# Patient Record
Sex: FEMALE | Race: WHITE | NOT HISPANIC OR LATINO | Employment: OTHER | URBAN - NONMETROPOLITAN AREA
[De-identification: names, ages, dates, MRNs, and addresses within clinical notes are randomized per-mention and may not be internally consistent; named-entity substitution may affect disease eponyms.]

---

## 2017-01-11 RX ORDER — ALPRAZOLAM 0.5 MG/1
TABLET ORAL
Qty: 60 TABLET | Refills: 0 | Status: SHIPPED | OUTPATIENT
Start: 2017-01-11 | End: 2017-03-20 | Stop reason: SDUPTHER

## 2017-01-25 ENCOUNTER — LAB (OUTPATIENT)
Dept: LAB | Facility: OTHER | Age: 82
End: 2017-01-25

## 2017-01-25 DIAGNOSIS — D50.9 IRON DEFICIENCY ANEMIA, UNSPECIFIED IRON DEFICIENCY ANEMIA TYPE: ICD-10-CM

## 2017-01-25 DIAGNOSIS — I10 ESSENTIAL HYPERTENSION: ICD-10-CM

## 2017-01-25 DIAGNOSIS — E78.5 HYPERLIPIDEMIA, UNSPECIFIED HYPERLIPIDEMIA TYPE: ICD-10-CM

## 2017-01-25 DIAGNOSIS — D64.9 ANEMIA, UNSPECIFIED TYPE: ICD-10-CM

## 2017-01-25 DIAGNOSIS — E78.5 HYPERLIPIDEMIA, UNSPECIFIED HYPERLIPIDEMIA TYPE: Primary | ICD-10-CM

## 2017-01-25 LAB
ALBUMIN SERPL-MCNC: 4.6 G/DL (ref 3.2–5.5)
ALBUMIN/GLOB SERPL: 1.5 G/DL (ref 1–3)
ALP SERPL-CCNC: 71 U/L (ref 15–121)
ALT SERPL W P-5'-P-CCNC: 25 U/L (ref 10–60)
ANION GAP SERPL CALCULATED.3IONS-SCNC: 8 MMOL/L (ref 5–15)
ARTICHOKE IGE QN: 79 MG/DL (ref 0–129)
AST SERPL-CCNC: 35 U/L (ref 10–60)
BASOPHILS # BLD AUTO: 0.02 10*3/MM3 (ref 0–0.2)
BASOPHILS NFR BLD AUTO: 0.4 % (ref 0–2)
BILIRUB SERPL-MCNC: 1.2 MG/DL (ref 0.2–1)
BUN BLD-MCNC: 9 MG/DL (ref 8–25)
BUN/CREAT SERPL: 10 (ref 7–25)
CALCIUM SPEC-SCNC: 10 MG/DL (ref 8.4–10.8)
CHLORIDE SERPL-SCNC: 104 MMOL/L (ref 100–112)
CO2 SERPL-SCNC: 26 MMOL/L (ref 20–32)
CREAT BLD-MCNC: 0.9 MG/DL (ref 0.4–1.3)
DEPRECATED RDW RBC AUTO: 40.9 FL (ref 36.4–46.3)
EOSINOPHIL # BLD AUTO: 0.15 10*3/MM3 (ref 0–0.7)
EOSINOPHIL NFR BLD AUTO: 3.2 % (ref 0–7)
ERYTHROCYTE [DISTWIDTH] IN BLOOD BY AUTOMATED COUNT: 13 % (ref 11.5–14.5)
GFR SERPL CREATININE-BSD FRML MDRD: 60 ML/MIN/1.73 (ref 39–90)
GLOBULIN UR ELPH-MCNC: 3.1 GM/DL (ref 2.5–4.6)
GLUCOSE BLD-MCNC: 109 MG/DL (ref 70–100)
HCT VFR BLD AUTO: 35.8 % (ref 35–45)
HGB BLD-MCNC: 12.3 G/DL (ref 12–15.5)
LYMPHOCYTES # BLD AUTO: 1.56 10*3/MM3 (ref 0.6–4.2)
LYMPHOCYTES NFR BLD AUTO: 32.9 % (ref 10–50)
MCH RBC QN AUTO: 30.3 PG (ref 26.5–34)
MCHC RBC AUTO-ENTMCNC: 34.4 G/DL (ref 31.4–36)
MCV RBC AUTO: 88.2 FL (ref 80–98)
MONOCYTES # BLD AUTO: 0.51 10*3/MM3 (ref 0–0.9)
MONOCYTES NFR BLD AUTO: 10.8 % (ref 0–12)
NEUTROPHILS # BLD AUTO: 2.5 10*3/MM3 (ref 2–8.6)
NEUTROPHILS NFR BLD AUTO: 52.7 % (ref 37–80)
PLATELET # BLD AUTO: 180 10*3/MM3 (ref 150–450)
PMV BLD AUTO: 10 FL (ref 8–12)
POTASSIUM BLD-SCNC: 4.8 MMOL/L (ref 3.4–5.4)
PROT SERPL-MCNC: 7.7 G/DL (ref 6.7–8.2)
RBC # BLD AUTO: 4.06 10*6/MM3 (ref 3.77–5.16)
SODIUM BLD-SCNC: 138 MMOL/L (ref 134–146)
WBC NRBC COR # BLD: 4.74 10*3/MM3 (ref 3.2–9.8)

## 2017-01-25 PROCEDURE — 83721 ASSAY OF BLOOD LIPOPROTEIN: CPT | Performed by: INTERNAL MEDICINE

## 2017-01-25 PROCEDURE — 82728 ASSAY OF FERRITIN: CPT | Performed by: INTERNAL MEDICINE

## 2017-01-25 PROCEDURE — 36415 COLL VENOUS BLD VENIPUNCTURE: CPT | Performed by: INTERNAL MEDICINE

## 2017-01-25 PROCEDURE — 80053 COMPREHEN METABOLIC PANEL: CPT | Performed by: INTERNAL MEDICINE

## 2017-01-25 PROCEDURE — 85025 COMPLETE CBC W/AUTO DIFF WBC: CPT | Performed by: INTERNAL MEDICINE

## 2017-01-25 PROCEDURE — 82607 VITAMIN B-12: CPT | Performed by: INTERNAL MEDICINE

## 2017-01-26 LAB
FERRITIN SERPL-MCNC: 28.8 NG/ML (ref 11.1–264)
VIT B12 BLD-MCNC: 495 PG/ML (ref 239–931)

## 2017-01-30 RX ORDER — POTASSIUM CHLORIDE 750 MG/1
TABLET, FILM COATED, EXTENDED RELEASE ORAL
Qty: 30 TABLET | Refills: 11 | Status: SHIPPED | OUTPATIENT
Start: 2017-01-30 | End: 2017-02-01

## 2017-02-01 ENCOUNTER — OFFICE VISIT (OUTPATIENT)
Dept: FAMILY MEDICINE CLINIC | Facility: CLINIC | Age: 82
End: 2017-02-01

## 2017-02-01 VITALS
WEIGHT: 128 LBS | SYSTOLIC BLOOD PRESSURE: 118 MMHG | BODY MASS INDEX: 25.8 KG/M2 | DIASTOLIC BLOOD PRESSURE: 72 MMHG | HEIGHT: 59 IN | HEART RATE: 76 BPM

## 2017-02-01 DIAGNOSIS — E78.5 HYPERLIPIDEMIA, UNSPECIFIED HYPERLIPIDEMIA TYPE: ICD-10-CM

## 2017-02-01 DIAGNOSIS — D50.9 IRON DEFICIENCY ANEMIA, UNSPECIFIED IRON DEFICIENCY ANEMIA TYPE: ICD-10-CM

## 2017-02-01 DIAGNOSIS — R53.83 FATIGUE, UNSPECIFIED TYPE: ICD-10-CM

## 2017-02-01 DIAGNOSIS — R73.01 IMPAIRED FASTING GLUCOSE: ICD-10-CM

## 2017-02-01 DIAGNOSIS — M81.0 OSTEOPOROSIS: ICD-10-CM

## 2017-02-01 DIAGNOSIS — I10 ESSENTIAL HYPERTENSION: Primary | ICD-10-CM

## 2017-02-01 PROCEDURE — 99214 OFFICE O/P EST MOD 30 MIN: CPT | Performed by: INTERNAL MEDICINE

## 2017-02-01 RX ORDER — FERROUS SULFATE 325(65) MG
325 TABLET ORAL EVERY OTHER DAY
Qty: 15 TABLET | Refills: 11 | Status: SHIPPED | OUTPATIENT
Start: 2017-02-01 | End: 2018-02-19 | Stop reason: SDUPTHER

## 2017-02-01 NOTE — PROGRESS NOTES
Subjective     Brittney Rollins is a 82 y.o. female.     History of Present Illness   Brittney is here for six-month followup of high cholesterol, hypertension, GERD, osteoporosis, REY, and hypokalemia, among other issues.      DEXA dated 06/2015 revealed osteoporosis.  She experienced fatigue and musculoskeletal pain with her first Prolia injection.  She also reportd a pruritic rash on her left thigh following the injection.  She did not have any additional Prolia injections.  We discussed treatment options.  From a GI standpoint, I don't think she could tolerate an oral bisphosphonates due to GERD/gastritis.  We will re-evaluate DEXA 06/2017 and discuss options at that time pending results of the DEXA.  She denies any recent falls.     She continues to take Xanax chronically for anxiety.  I have tried many nonaddictive medications for anxiety through the years.  She never tries them for more than a few days if at all.    Her weight is up 2 pounds in the past 6 months.  Her blood pressure is well controlled.    She stopped taking her potassium supplement approximately 2 months ago.  Potassium level is normal.  I told her to stop the K-Dur.    With her visit in summer 2016, she was complaining of some constipation issues with the oral iron tablet twice weekly.  Her ferritin level was adequate, so I told her to reduce the ferrous sulfate to 1 time weekly.  She has not been taking the ferritin at all for at least a few months.  Her ferritin level has declined from 44-28.  I have asked her to resume ferrous sulfate once weekly.    Her labs are all reviewed with results listed below.    Review of Systems   Constitutional: Positive for fatigue. Negative for chills and fever.   HENT: Negative for congestion, ear pain, postnasal drip, sinus pressure and sore throat.    Respiratory: Negative for cough, shortness of breath and wheezing.    Cardiovascular: Negative for chest pain, palpitations and leg swelling.  "  Gastrointestinal: Negative for abdominal pain, blood in stool, constipation, diarrhea, nausea and vomiting.   Endocrine: Negative for cold intolerance, heat intolerance, polydipsia and polyuria.   Genitourinary: Negative for dysuria, frequency, hematuria and urgency.   Musculoskeletal: Positive for arthralgias and back pain.   Skin: Negative for rash.   Neurological: Negative for syncope and weakness.       Objective     Visit Vitals   • /72   • Pulse 76   • Ht 59\" (149.9 cm)   • Wt 128 lb (58.1 kg)   • BMI 25.85 kg/m2       Physical Exam   Constitutional: She is oriented to person, place, and time. She appears well-developed and well-nourished. No distress.   Somewhat frail   HENT:   Head: Normocephalic and atraumatic.   Nose: Right sinus exhibits no maxillary sinus tenderness and no frontal sinus tenderness. Left sinus exhibits no maxillary sinus tenderness and no frontal sinus tenderness.   Mouth/Throat: Uvula is midline, oropharynx is clear and moist and mucous membranes are normal. No oral lesions. No tonsillar exudate.   Eyes: Conjunctivae and EOM are normal. Pupils are equal, round, and reactive to light.   Neck: Trachea normal. Neck supple. No JVD present. Carotid bruit is not present. No tracheal deviation present. No thyroid mass and no thyromegaly present.   Cardiovascular: Normal rate, regular rhythm and normal heart sounds.   No extrasystoles are present. PMI is not displaced.    No murmur heard.  Pulmonary/Chest: Effort normal and breath sounds normal. No accessory muscle usage. No respiratory distress. She has no decreased breath sounds. She has no wheezes. She has no rhonchi. She has no rales.   Abdominal: Soft. Bowel sounds are normal. She exhibits no distension. There is no hepatosplenomegaly. There is no tenderness.   Musculoskeletal:   Normal range of motion of the left shoulder, but a little tender with extreme of external rotation.       Vascular Status -  Her exam exhibits right foot " vasculature normal. Her exam exhibits no right foot edema. Her exam exhibits left foot vasculature normal. Her exam exhibits no left foot edema.  Lymphadenopathy:     She has no cervical adenopathy.   Neurological: She is alert and oriented to person, place, and time. No cranial nerve deficit. Coordination normal.   Skin: Skin is warm, dry and intact. No rash noted. No cyanosis. Nails show no clubbing.   Psychiatric: She has a normal mood and affect. Her speech is normal and behavior is normal. Thought content normal.   Vitals reviewed.      Assessment/Plan   Restart ferrous sulfate, but only once weekly initially.  We will continue to follow.    Stop the K-Dur.    Continue her other current medications.  Continue to be cautious regarding NSAID use due to her history of gastritis and GERD.    Repeat DEXA in the summer of 2017.  See above.      Diagnoses and all orders for this visit:    Essential hypertension  -     CBC Auto Differential; Future  -     Comprehensive Metabolic Panel; Future    Hyperlipidemia, unspecified hyperlipidemia type  -     Lipid Panel; Future    Impaired fasting glucose    Osteoporosis    Fatigue, unspecified type  -     TSH; Future    Iron deficiency anemia, unspecified iron deficiency anemia type  -     Ferritin; Future    Other orders  -     ferrous sulfate 325 (65 FE) MG tablet; Take 1 tablet by mouth Every Other Day.      Lab on 01/25/2017   Component Date Value Ref Range Status   • Glucose 01/25/2017 109* 70 - 100 mg/dL Final   • BUN 01/25/2017 9  8 - 25 mg/dL Final   • Creatinine 01/25/2017 0.90  0.40 - 1.30 mg/dL Final   • Sodium 01/25/2017 138  134 - 146 mmol/L Final   • Potassium 01/25/2017 4.8  3.4 - 5.4 mmol/L Final   • Chloride 01/25/2017 104  100 - 112 mmol/L Final   • CO2 01/25/2017 26.0  20.0 - 32.0 mmol/L Final   • Calcium 01/25/2017 10.0  8.4 - 10.8 mg/dL Final   • Total Protein 01/25/2017 7.7  6.7 - 8.2 g/dL Final   • Albumin 01/25/2017 4.60  3.20 - 5.50 g/dL Final   • ALT  (SGPT) 01/25/2017 25  10 - 60 U/L Final   • AST (SGOT) 01/25/2017 35  10 - 60 U/L Final   • Alkaline Phosphatase 01/25/2017 71  15 - 121 U/L Final   • Total Bilirubin 01/25/2017 1.2* 0.2 - 1.0 mg/dL Final   • eGFR Non African Amer 01/25/2017 60  39 - 90 mL/min/1.73 Final   • Globulin 01/25/2017 3.1  2.5 - 4.6 gm/dL Final   • A/G Ratio 01/25/2017 1.5  1.0 - 3.0 g/dL Final   • BUN/Creatinine Ratio 01/25/2017 10.0  7.0 - 25.0 Final   • Anion Gap 01/25/2017 8.0  5.0 - 15.0 mmol/L Final   • LDL Cholesterol  01/25/2017 79  0 - 129 mg/dL Final   • WBC 01/25/2017 4.74  3.20 - 9.80 10*3/mm3 Final   • RBC 01/25/2017 4.06  3.77 - 5.16 10*6/mm3 Final   • Hemoglobin 01/25/2017 12.3  12.0 - 15.5 g/dL Final   • Hematocrit 01/25/2017 35.8  35.0 - 45.0 % Final   • MCV 01/25/2017 88.2  80.0 - 98.0 fL Final   • MCH 01/25/2017 30.3  26.5 - 34.0 pg Final   • MCHC 01/25/2017 34.4  31.4 - 36.0 g/dL Final   • RDW 01/25/2017 13.0  11.5 - 14.5 % Final   • RDW-SD 01/25/2017 40.9  36.4 - 46.3 fl Final   • MPV 01/25/2017 10.0  8.0 - 12.0 fL Final   • Platelets 01/25/2017 180  150 - 450 10*3/mm3 Final   • Neutrophil % 01/25/2017 52.7  37.0 - 80.0 % Final   • Lymphocyte % 01/25/2017 32.9  10.0 - 50.0 % Final   • Monocyte % 01/25/2017 10.8  0.0 - 12.0 % Final   • Eosinophil % 01/25/2017 3.2  0.0 - 7.0 % Final   • Basophil % 01/25/2017 0.4  0.0 - 2.0 % Final   • Neutrophils, Absolute 01/25/2017 2.50  2.00 - 8.60 10*3/mm3 Final   • Lymphocytes, Absolute 01/25/2017 1.56  0.60 - 4.20 10*3/mm3 Final   • Monocytes, Absolute 01/25/2017 0.51  0.00 - 0.90 10*3/mm3 Final   • Eosinophils, Absolute 01/25/2017 0.15  0.00 - 0.70 10*3/mm3 Final   • Basophils, Absolute 01/25/2017 0.02  0.00 - 0.20 10*3/mm3 Final   • Vitamin B-12 01/25/2017 495  239 - 931 pg/mL Final   • Ferritin 01/25/2017 28.80  11.10 - 264.00 ng/mL Final   ]

## 2017-03-20 RX ORDER — ALPRAZOLAM 0.5 MG/1
TABLET ORAL
Qty: 60 TABLET | Refills: 0 | Status: SHIPPED | OUTPATIENT
Start: 2017-03-20 | End: 2017-05-31 | Stop reason: SDUPTHER

## 2017-03-20 RX ORDER — ATORVASTATIN CALCIUM 10 MG/1
TABLET, FILM COATED ORAL
Qty: 90 TABLET | Refills: 3 | Status: SHIPPED | OUTPATIENT
Start: 2017-03-20 | End: 2017-12-12 | Stop reason: SDUPTHER

## 2017-03-20 RX ORDER — ATENOLOL 50 MG/1
TABLET ORAL
Qty: 90 TABLET | Refills: 3 | Status: SHIPPED | OUTPATIENT
Start: 2017-03-20 | End: 2017-12-12 | Stop reason: SDUPTHER

## 2017-04-07 RX ORDER — LISINOPRIL 10 MG/1
10 TABLET ORAL DAILY
Qty: 90 TABLET | Refills: 3 | Status: SHIPPED | OUTPATIENT
Start: 2017-04-07 | End: 2018-01-17 | Stop reason: SDUPTHER

## 2017-05-31 RX ORDER — ALPRAZOLAM 0.5 MG/1
TABLET ORAL
Qty: 60 TABLET | Refills: 0 | Status: SHIPPED | OUTPATIENT
Start: 2017-05-31 | End: 2017-08-01 | Stop reason: SDUPTHER

## 2017-05-31 RX ORDER — NAPROXEN 375 MG/1
375 TABLET ORAL DAILY PRN
Qty: 30 TABLET | Refills: 2 | Status: SHIPPED | OUTPATIENT
Start: 2017-05-31 | End: 2017-12-12 | Stop reason: SDUPTHER

## 2017-07-13 RX ORDER — ALPRAZOLAM 0.5 MG/1
TABLET ORAL
Qty: 60 TABLET | Refills: 0 | OUTPATIENT
Start: 2017-07-13

## 2017-07-26 ENCOUNTER — LAB (OUTPATIENT)
Dept: LAB | Facility: OTHER | Age: 82
End: 2017-07-26

## 2017-07-26 DIAGNOSIS — E78.5 HYPERLIPIDEMIA, UNSPECIFIED HYPERLIPIDEMIA TYPE: ICD-10-CM

## 2017-07-26 DIAGNOSIS — R53.83 FATIGUE, UNSPECIFIED TYPE: ICD-10-CM

## 2017-07-26 DIAGNOSIS — D50.9 IRON DEFICIENCY ANEMIA, UNSPECIFIED IRON DEFICIENCY ANEMIA TYPE: ICD-10-CM

## 2017-07-26 DIAGNOSIS — I10 ESSENTIAL HYPERTENSION: ICD-10-CM

## 2017-07-26 LAB
ALBUMIN SERPL-MCNC: 4.6 G/DL (ref 3.2–5.5)
ALBUMIN/GLOB SERPL: 1.4 G/DL (ref 1–3)
ALP SERPL-CCNC: 68 U/L (ref 15–121)
ALT SERPL W P-5'-P-CCNC: 22 U/L (ref 10–60)
ANION GAP SERPL CALCULATED.3IONS-SCNC: 11 MMOL/L (ref 5–15)
AST SERPL-CCNC: 33 U/L (ref 10–60)
BASOPHILS # BLD AUTO: 0.01 10*3/MM3 (ref 0–0.2)
BASOPHILS NFR BLD AUTO: 0.2 % (ref 0–2)
BILIRUB SERPL-MCNC: 1.2 MG/DL (ref 0.2–1)
BUN BLD-MCNC: 13 MG/DL (ref 8–25)
BUN/CREAT SERPL: 16.3 (ref 7–25)
CALCIUM SPEC-SCNC: 9.4 MG/DL (ref 8.4–10.8)
CHLORIDE SERPL-SCNC: 97 MMOL/L (ref 100–112)
CHOLEST SERPL-MCNC: 139 MG/DL (ref 150–200)
CO2 SERPL-SCNC: 22 MMOL/L (ref 20–32)
CREAT BLD-MCNC: 0.8 MG/DL (ref 0.4–1.3)
DEPRECATED RDW RBC AUTO: 40.9 FL (ref 36.4–46.3)
EOSINOPHIL # BLD AUTO: 0.1 10*3/MM3 (ref 0–0.7)
EOSINOPHIL NFR BLD AUTO: 2.1 % (ref 0–7)
ERYTHROCYTE [DISTWIDTH] IN BLOOD BY AUTOMATED COUNT: 12.8 % (ref 11.5–14.5)
GFR SERPL CREATININE-BSD FRML MDRD: 69 ML/MIN/1.73 (ref 39–90)
GLOBULIN UR ELPH-MCNC: 3.4 GM/DL (ref 2.5–4.6)
GLUCOSE BLD-MCNC: 93 MG/DL (ref 70–100)
HCT VFR BLD AUTO: 36 % (ref 35–45)
HDLC SERPL-MCNC: 52 MG/DL (ref 35–100)
HGB BLD-MCNC: 12.7 G/DL (ref 12–15.5)
LDLC SERPL CALC-MCNC: 63 MG/DL
LDLC/HDLC SERPL: 1.21 {RATIO}
LYMPHOCYTES # BLD AUTO: 1.52 10*3/MM3 (ref 0.6–4.2)
LYMPHOCYTES NFR BLD AUTO: 31.5 % (ref 10–50)
MCH RBC QN AUTO: 31.6 PG (ref 26.5–34)
MCHC RBC AUTO-ENTMCNC: 35.3 G/DL (ref 31.4–36)
MCV RBC AUTO: 89.6 FL (ref 80–98)
MONOCYTES # BLD AUTO: 0.71 10*3/MM3 (ref 0–0.9)
MONOCYTES NFR BLD AUTO: 14.7 % (ref 0–12)
NEUTROPHILS # BLD AUTO: 2.49 10*3/MM3 (ref 2–8.6)
NEUTROPHILS NFR BLD AUTO: 51.5 % (ref 37–80)
PLATELET # BLD AUTO: 197 10*3/MM3 (ref 150–450)
PMV BLD AUTO: 9.4 FL (ref 8–12)
POTASSIUM BLD-SCNC: 3.9 MMOL/L (ref 3.4–5.4)
PROT SERPL-MCNC: 8 G/DL (ref 6.7–8.2)
RBC # BLD AUTO: 4.02 10*6/MM3 (ref 3.77–5.16)
SODIUM BLD-SCNC: 130 MMOL/L (ref 134–146)
TRIGL SERPL-MCNC: 120 MG/DL (ref 35–160)
VLDLC SERPL-MCNC: 24 MG/DL
WBC NRBC COR # BLD: 4.83 10*3/MM3 (ref 3.2–9.8)

## 2017-07-26 PROCEDURE — 82728 ASSAY OF FERRITIN: CPT | Performed by: INTERNAL MEDICINE

## 2017-07-26 PROCEDURE — 85025 COMPLETE CBC W/AUTO DIFF WBC: CPT | Performed by: INTERNAL MEDICINE

## 2017-07-26 PROCEDURE — 80061 LIPID PANEL: CPT | Performed by: INTERNAL MEDICINE

## 2017-07-26 PROCEDURE — 84443 ASSAY THYROID STIM HORMONE: CPT | Performed by: INTERNAL MEDICINE

## 2017-07-26 PROCEDURE — 36415 COLL VENOUS BLD VENIPUNCTURE: CPT | Performed by: INTERNAL MEDICINE

## 2017-07-26 PROCEDURE — 80053 COMPREHEN METABOLIC PANEL: CPT | Performed by: INTERNAL MEDICINE

## 2017-07-27 LAB
FERRITIN SERPL-MCNC: 94 NG/ML (ref 11.1–264)
TSH SERPL DL<=0.05 MIU/L-ACNC: 2.18 MIU/ML (ref 0.46–4.68)

## 2017-08-01 ENCOUNTER — OFFICE VISIT (OUTPATIENT)
Dept: FAMILY MEDICINE CLINIC | Facility: CLINIC | Age: 82
End: 2017-08-01

## 2017-08-01 VITALS
SYSTOLIC BLOOD PRESSURE: 130 MMHG | DIASTOLIC BLOOD PRESSURE: 60 MMHG | HEIGHT: 59 IN | WEIGHT: 121.8 LBS | HEART RATE: 68 BPM | TEMPERATURE: 98.2 F | BODY MASS INDEX: 24.56 KG/M2

## 2017-08-01 DIAGNOSIS — I71.20 THORACIC AORTIC ANEURYSM WITHOUT RUPTURE (HCC): Chronic | ICD-10-CM

## 2017-08-01 DIAGNOSIS — F41.1 GENERALIZED ANXIETY DISORDER: Chronic | ICD-10-CM

## 2017-08-01 DIAGNOSIS — G47.9 SLEEP DISORDER: Chronic | ICD-10-CM

## 2017-08-01 DIAGNOSIS — I10 ESSENTIAL HYPERTENSION: Chronic | ICD-10-CM

## 2017-08-01 DIAGNOSIS — M81.0 OSTEOPOROSIS: Chronic | ICD-10-CM

## 2017-08-01 DIAGNOSIS — F34.1 DYSTHYMIA: Chronic | ICD-10-CM

## 2017-08-01 DIAGNOSIS — D50.9 IRON DEFICIENCY ANEMIA, UNSPECIFIED IRON DEFICIENCY ANEMIA TYPE: Chronic | ICD-10-CM

## 2017-08-01 DIAGNOSIS — R73.01 IMPAIRED FASTING GLUCOSE: Chronic | ICD-10-CM

## 2017-08-01 DIAGNOSIS — E78.2 MIXED HYPERLIPIDEMIA: Primary | Chronic | ICD-10-CM

## 2017-08-01 DIAGNOSIS — K58.1 IRRITABLE BOWEL SYNDROME WITH CONSTIPATION: Chronic | ICD-10-CM

## 2017-08-01 DIAGNOSIS — K29.30 CHRONIC SUPERFICIAL GASTRITIS WITHOUT BLEEDING: Chronic | ICD-10-CM

## 2017-08-01 PROCEDURE — 99214 OFFICE O/P EST MOD 30 MIN: CPT | Performed by: INTERNAL MEDICINE

## 2017-08-01 RX ORDER — POTASSIUM CHLORIDE 750 MG/1
1 TABLET, FILM COATED, EXTENDED RELEASE ORAL DAILY
Refills: 11 | COMMUNITY
Start: 2017-07-07 | End: 2018-03-09 | Stop reason: SDUPTHER

## 2017-08-01 RX ORDER — ALPRAZOLAM 0.5 MG/1
TABLET ORAL
Qty: 60 TABLET | Refills: 0 | Status: SHIPPED | OUTPATIENT
Start: 2017-08-01 | End: 2017-10-02 | Stop reason: SDUPTHER

## 2017-08-01 NOTE — PROGRESS NOTES
Subjective     Brittney Rollins is a 82 y.o. female.     History of Present Illness   Brittney is here for six-month followup of high cholesterol, hypertension, GERD, osteoporosis, REY, and hypokalemia, among other issues.       DEXA dated 06/2015 revealed osteoporosis.  She experienced fatigue and musculoskeletal pain with her first Prolia injection.  She also reportd a pruritic rash on her left thigh following the injection.  She did not have any additional Prolia injections.   From a GI standpoint, I don't think she could tolerate an oral bisphosphonates due to GERD/gastritis.   She is due repeat DEXA this summer.  I recommend obtaining the DEXA and then discuss options at that time pending results of the DEXA.  She denies any recent falls.      She continues to take Xanax chronically for anxiety.  I have tried many nonaddictive medications for anxiety through the years.  She never tries them for more than a few days if at all.    She continues to report chronic but stable back pain due to spinal stenosis and DJD.  Symptoms are aggravated by standing and relieved sitting or the supine position.  She denies any radicular symptoms.    She has a history of thoracic aortic aneurysm.  I recommend repeat imaging to reevaluate this.  She agrees.    She has a chronic iron deficiency anemia.  She takes iron very frequently, it aggravates constipation.  She can usually tolerate supplemental ferrous sulfate once weekly.  We asked her to resume the ferrous sulfate 6 months ago when she had discontinued it, resulting in a gradual decrease in her ferritin.    Her labs are all reviewed with results listed below.  Hemoglobin is normal.  Ferritin has improved.  The profile is at goal.  Liver and renal functions are normal.  Review of Systems   Constitutional: Positive for fatigue. Negative for chills and fever.   HENT: Negative for congestion, ear pain, postnasal drip, sinus pressure and sore throat.    Respiratory: Negative for  "cough, shortness of breath and wheezing.    Cardiovascular: Negative for chest pain, palpitations and leg swelling.   Gastrointestinal: Negative for abdominal pain, blood in stool, constipation, diarrhea, nausea and vomiting.   Endocrine: Negative for cold intolerance, heat intolerance, polydipsia and polyuria.   Genitourinary: Negative for dysuria, frequency, hematuria and urgency.   Musculoskeletal: Positive for arthralgias and back pain.   Skin: Negative for rash.   Neurological: Negative for syncope and weakness.       Objective     /60  Pulse 68  Temp 98.2 °F (36.8 °C) (Oral)   Ht 59\" (149.9 cm)  Wt 121 lb 12.8 oz (55.2 kg)  BMI 24.6 kg/m2    Physical Exam   Constitutional: She is oriented to person, place, and time. She appears well-developed and well-nourished. No distress.   HENT:   Head: Normocephalic and atraumatic.   Nose: Right sinus exhibits no maxillary sinus tenderness and no frontal sinus tenderness. Left sinus exhibits no maxillary sinus tenderness and no frontal sinus tenderness.   Mouth/Throat: Uvula is midline, oropharynx is clear and moist and mucous membranes are normal. No oral lesions. No tonsillar exudate.   Eyes: Conjunctivae and EOM are normal. Pupils are equal, round, and reactive to light.   Neck: Trachea normal. Neck supple. No JVD present. Carotid bruit is not present. No tracheal deviation present. No thyroid mass and no thyromegaly present.   Cardiovascular: Normal rate, regular rhythm and normal heart sounds.   No extrasystoles are present. PMI is not displaced.    No murmur heard.  Pulmonary/Chest: Effort normal and breath sounds normal. No accessory muscle usage. No respiratory distress. She has no decreased breath sounds. She has no wheezes. She has no rhonchi. She has no rales.   Abdominal: Soft. Bowel sounds are normal. She exhibits no distension. There is no hepatosplenomegaly. There is no tenderness.       Vascular Status -  Her exam exhibits right foot vasculature " normal. Her exam exhibits no right foot edema. Her exam exhibits left foot vasculature normal. Her exam exhibits no left foot edema.  Lymphadenopathy:     She has no cervical adenopathy.   Neurological: She is alert and oriented to person, place, and time. No cranial nerve deficit. Coordination normal.   Skin: Skin is warm, dry and intact. No rash noted. No cyanosis. Nails show no clubbing.   Psychiatric: She has a normal mood and affect. Her speech is normal and behavior is normal. Thought content normal.   Vitals reviewed.      PHQ-9 Depression Screening 8/1/2017   Little interest or pleasure in doing things 1   Feeling down, depressed, or hopeless 1   Trouble falling or staying asleep, or sleeping too much 3   Feeling tired or having little energy 3   Poor appetite or overeating 0   Feeling bad about yourself - or that you are a failure or have let yourself or your family down 0   Trouble concentrating on things, such as reading the newspaper or watching television 2   Moving or speaking so slowly that other people could have noticed. Or the opposite - being so fidgety or restless that you have been moving around a lot more than usual 0   Thoughts that you would be better off dead, or of hurting yourself in some way 0   PHQ-9 Total Score 10   If you checked off any problems, how difficult have these problems made it for you to do your work, take care of things at home, or get along with other people? Somewhat difficult       Assessment/Plan   Schedule DEXA.  Continue calcium and vitamin D supplement.  We can discuss options with the patient after we have seen the results of the repeat bone density.  She could not tolerate Prolia.  If we start her on Fosamax, we will want to watch closely for GI intolerance.    Schedule CT angiogram of the chest reevaluate the aortic ascending aneurysm, which I suspect will be stable.    Continue the other current medications and risk factor modifications treat the other chronic  issues we addressed today.    Continue iron supplement once weekly.    Continue the current vitamin and mineral supplements.    Return to clinic in 6 months with fasting labs prior.  Diagnoses and all orders for this visit:    Mixed hyperlipidemia  -     LDL Cholesterol, Direct; Future    Sleep disorder    Generalized anxiety disorder    Dysthymia    Iron deficiency anemia, unspecified iron deficiency anemia type  -     Vitamin B12; Future  -     CBC Auto Differential; Future  -     Comprehensive Metabolic Panel; Future  -     Ferritin; Future    Osteoporosis  -     Vitamin D 25 Hydroxy; Future  -     DEXA Bone Density Axial; Future    Impaired fasting glucose    Chronic superficial gastritis without bleeding    Irritable bowel syndrome with constipation    Essential hypertension    Thoracic aortic aneurysm without rupture  -     CT Angiogram Chest With & Without Contrast    Other orders  -     ALPRAZolam (XANAX) 0.5 MG tablet; 1/2 to 1 tab bid  -     potassium chloride (K-DUR) 10 MEQ CR tablet; Take 1 tablet by mouth Daily.        Lab on 07/26/2017   Component Date Value Ref Range Status   • WBC 07/26/2017 4.83  3.20 - 9.80 10*3/mm3 Final   • RBC 07/26/2017 4.02  3.77 - 5.16 10*6/mm3 Final   • Hemoglobin 07/26/2017 12.7  12.0 - 15.5 g/dL Final   • Hematocrit 07/26/2017 36.0  35.0 - 45.0 % Final   • MCV 07/26/2017 89.6  80.0 - 98.0 fL Final   • MCH 07/26/2017 31.6  26.5 - 34.0 pg Final   • MCHC 07/26/2017 35.3  31.4 - 36.0 g/dL Final   • RDW 07/26/2017 12.8  11.5 - 14.5 % Final   • RDW-SD 07/26/2017 40.9  36.4 - 46.3 fl Final   • MPV 07/26/2017 9.4  8.0 - 12.0 fL Final   • Platelets 07/26/2017 197  150 - 450 10*3/mm3 Final   • Neutrophil % 07/26/2017 51.5  37.0 - 80.0 % Final   • Lymphocyte % 07/26/2017 31.5  10.0 - 50.0 % Final   • Monocyte % 07/26/2017 14.7* 0.0 - 12.0 % Final   • Eosinophil % 07/26/2017 2.1  0.0 - 7.0 % Final   • Basophil % 07/26/2017 0.2  0.0 - 2.0 % Final   • Neutrophils, Absolute 07/26/2017  2.49  2.00 - 8.60 10*3/mm3 Final   • Lymphocytes, Absolute 07/26/2017 1.52  0.60 - 4.20 10*3/mm3 Final   • Monocytes, Absolute 07/26/2017 0.71  0.00 - 0.90 10*3/mm3 Final   • Eosinophils, Absolute 07/26/2017 0.10  0.00 - 0.70 10*3/mm3 Final   • Basophils, Absolute 07/26/2017 0.01  0.00 - 0.20 10*3/mm3 Final   • Glucose 07/26/2017 93  70 - 100 mg/dL Final   • BUN 07/26/2017 13  8 - 25 mg/dL Final   • Creatinine 07/26/2017 0.80  0.40 - 1.30 mg/dL Final   • Sodium 07/26/2017 130* 134 - 146 mmol/L Final   • Potassium 07/26/2017 3.9  3.4 - 5.4 mmol/L Final   • Chloride 07/26/2017 97* 100 - 112 mmol/L Final   • CO2 07/26/2017 22.0  20.0 - 32.0 mmol/L Final   • Calcium 07/26/2017 9.4  8.4 - 10.8 mg/dL Final   • Total Protein 07/26/2017 8.0  6.7 - 8.2 g/dL Final   • Albumin 07/26/2017 4.60  3.20 - 5.50 g/dL Final   • ALT (SGPT) 07/26/2017 22  10 - 60 U/L Final   • AST (SGOT) 07/26/2017 33  10 - 60 U/L Final   • Alkaline Phosphatase 07/26/2017 68  15 - 121 U/L Final   • Total Bilirubin 07/26/2017 1.2* 0.2 - 1.0 mg/dL Final   • eGFR Non African Amer 07/26/2017 69  39 - 90 mL/min/1.73 Final   • Globulin 07/26/2017 3.4  2.5 - 4.6 gm/dL Final   • A/G Ratio 07/26/2017 1.4  1.0 - 3.0 g/dL Final   • BUN/Creatinine Ratio 07/26/2017 16.3  7.0 - 25.0 Final   • Anion Gap 07/26/2017 11.0  5.0 - 15.0 mmol/L Final   • Ferritin 07/26/2017 94.00  11.10 - 264.00 ng/mL Final   • Total Cholesterol 07/26/2017 139* 150 - 200 mg/dL Final   • Triglycerides 07/26/2017 120  35 - 160 mg/dL Final   • HDL Cholesterol 07/26/2017 52  35 - 100 mg/dL Final   • LDL Cholesterol  07/26/2017 63  mg/dL Final   • VLDL Cholesterol 07/26/2017 24  mg/dL Final   • LDL/HDL Ratio 07/26/2017 1.21   Final   • TSH 07/26/2017 2.180  0.460 - 4.680 mIU/mL Final   ]

## 2017-08-22 ENCOUNTER — OFFICE VISIT (OUTPATIENT)
Dept: FAMILY MEDICINE CLINIC | Facility: CLINIC | Age: 82
End: 2017-08-22

## 2017-08-22 VITALS
DIASTOLIC BLOOD PRESSURE: 76 MMHG | SYSTOLIC BLOOD PRESSURE: 134 MMHG | TEMPERATURE: 97.4 F | HEART RATE: 80 BPM | WEIGHT: 123.2 LBS | HEIGHT: 59 IN | BODY MASS INDEX: 24.84 KG/M2

## 2017-08-22 DIAGNOSIS — I71.20 THORACIC AORTIC ANEURYSM WITHOUT RUPTURE (HCC): Chronic | ICD-10-CM

## 2017-08-22 DIAGNOSIS — M85.80 OSTEOPENIA: ICD-10-CM

## 2017-08-22 DIAGNOSIS — R91.1 NODULE OF LEFT LUNG: Primary | ICD-10-CM

## 2017-08-22 DIAGNOSIS — Z12.31 ENCOUNTER FOR SCREENING MAMMOGRAM FOR MALIGNANT NEOPLASM OF BREAST: ICD-10-CM

## 2017-08-22 DIAGNOSIS — M81.0 OSTEOPOROSIS: Chronic | ICD-10-CM

## 2017-08-22 PROCEDURE — 99214 OFFICE O/P EST MOD 30 MIN: CPT | Performed by: INTERNAL MEDICINE

## 2017-08-22 NOTE — PROGRESS NOTES
Subjective     History of Present Illness     Brittney Rollins is a 82 y.o. female who returns today to review the results of her CT scan and bone density test.  DEXA dated 08/01/17 reveals persistent osteoporosis of the hip.  She was unable to tolerate Prolia due to fatigue and musculoskeletal pain as well as a pruritic rash at the injection site.  She continues on calcium and vitamin D supplements.  We discussed fall risks and precautions.  She denies frequent falls, but does admit to occasional falls. We also discussed treatment options for the osteoporosis.  From a GI standpoint, I had concerns of oral bisphosphonates due to GERD/gastritis symptoms.  Her daughter reports she tried Fosamax in the past, which caused lower extremity edema.  The remaining option is daily injections of Forteo.   After discussing the options, she has decided to consider her options and let us know what treatment she wants to pursue.      She has a history of thoracic aortic aneurysm, for which I recommended CT imaging.  CT dated 08/01/17 revealed ectasia/aneurysmal dilatation of the ascending thoracic aorta measuring 4.3 cm, dilated common bile duct measuring 10 mm in diameter, of uncertain etiology, along with mild intrahepatic biliary dilatation, and 8 mm nodule in the posterior left lower lobe is most likely inflammatory, however, repeat was recommended in three months to assess for stability.     We discussed results of the CT and I recommended annual surveillance of the thoracic aortic aneurysm.  We also discussed warning symptoms.  We also discussed the incidental finding of 8 mm pulmonary nodule.  She denies history of tobacco use.  I recommended repeat CT in three months to assess stability.       Her last mammogram was completed 08/2012. I recommended she have a repeat mammogram and she reluctantly agrees.        Review of Systems   Constitutional: Negative for chills, fatigue and fever.   HENT: Negative for congestion, ear  "pain, postnasal drip, sinus pressure and sore throat.    Respiratory: Negative for cough, shortness of breath and wheezing.    Cardiovascular: Negative for chest pain, palpitations and leg swelling.   Gastrointestinal: Negative for abdominal pain, blood in stool, constipation, diarrhea, nausea and vomiting.   Endocrine: Negative for cold intolerance, heat intolerance, polydipsia and polyuria.   Genitourinary: Negative for dysuria, frequency, hematuria and urgency.   Skin: Negative for rash.   Neurological: Negative for syncope and weakness.        Objective     Vitals:    08/22/17 1412   BP: 134/76   Pulse: 80   Temp: 97.4 °F (36.3 °C)   TempSrc: Oral   Weight: 123 lb 3.2 oz (55.9 kg)   Height: 59\" (149.9 cm)     Physical Exam   Constitutional: She is oriented to person, place, and time. She appears well-developed and well-nourished. No distress.   Accompanied by her daughter.     HENT:   Head: Normocephalic and atraumatic.   Nose: Right sinus exhibits no maxillary sinus tenderness and no frontal sinus tenderness. Left sinus exhibits no maxillary sinus tenderness and no frontal sinus tenderness.   Mouth/Throat: Uvula is midline, oropharynx is clear and moist and mucous membranes are normal. No oral lesions. No tonsillar exudate.   Eyes: Conjunctivae and EOM are normal. Pupils are equal, round, and reactive to light.   Neck: Trachea normal. Neck supple. No JVD present. Carotid bruit is not present. No tracheal deviation present. No thyroid mass and no thyromegaly present.   Cardiovascular: Normal rate, regular rhythm and normal heart sounds.   No extrasystoles are present. PMI is not displaced.    No murmur heard.  Pulmonary/Chest: Effort normal and breath sounds normal. No accessory muscle usage. No respiratory distress. She has no decreased breath sounds. She has no wheezes. She has no rhonchi. She has no rales.   Abdominal: Soft. Bowel sounds are normal. She exhibits no distension. There is no hepatosplenomegaly. " There is no tenderness.       Vascular Status -  Her exam exhibits right foot vasculature normal. Her exam exhibits no right foot edema. Her exam exhibits left foot vasculature normal. Her exam exhibits no left foot edema.  Lymphadenopathy:     She has no cervical adenopathy.   Neurological: She is alert and oriented to person, place, and time. No cranial nerve deficit. Coordination normal.   Skin: Skin is warm, dry and intact. No rash noted. No cyanosis. Nails show no clubbing.   Psychiatric: She has a normal mood and affect. Her speech is normal and behavior is normal. Thought content normal.   Vitals reviewed.    Future Appointments  Date Time Provider Department Center   9/13/2017 1:00 PM [facility] PWD MAMM 1 [facility] EN P[facility] [place]   11/22/2017 1:00 PM MAD PWD CT 1 [facility] CT POW [place]   2/2/2018 1:30 PM Pollo Dillon MD [facility] PC POW None         Assessment/Plan      We will schedule repeat noncontrast CT scan of the chest in 3 months to re-evaluate and hopefully document stability of the incidental finding of 8 mm left pulmonary nodule.    We discussed treatment options for the osteoporosis.  She has been intolerant to the Fosamax and Prolia in the past.  I recommended Prolia injections daily.  We discussed fall risks and precautions.   After discussing her treatment options, she wants to consider her options we discussed today and notify me of her choice of treatment.  Continue on calcium and vitamin D supplements.  Repeat DEXA will be due summer of 2019    [place] a repeat mammogram.  It has been five years since her most recent mammogram.        Scribed for Dr. Dillon by Sophie Méndez [facility]     Diagnoses and all orders for this visit:    Nodule of left lung  -     CT Chest Without Contrast    Osteoporosis    Encounter for screening mammogram for malignant neoplasm of breast  -     Mammo Screening Digital Tomosynthesis Bilateral With CAD; Future    Thoracic aortic aneurysm without rupture    Osteopenia      No  visits with results within 3 Week(s) from this visit.  Latest known visit with results is:    Lab on 07/26/2017   Component Date Value Ref Range Status   • WBC 07/26/2017 4.83  3.20 - 9.80 10*3/mm3 Final   • RBC 07/26/2017 4.02  3.77 - 5.16 10*6/mm3 Final   • Hemoglobin 07/26/2017 12.7  12.0 - 15.5 g/dL Final   • Hematocrit 07/26/2017 36.0  35.0 - 45.0 % Final   • MCV 07/26/2017 89.6  80.0 - 98.0 fL Final   • MCH 07/26/2017 31.6  26.5 - 34.0 pg Final   • MCHC 07/26/2017 35.3  31.4 - 36.0 g/dL Final   • RDW 07/26/2017 12.8  11.5 - 14.5 % Final   • RDW-SD 07/26/2017 40.9  36.4 - 46.3 fl Final   • MPV 07/26/2017 9.4  8.0 - 12.0 fL Final   • Platelets 07/26/2017 197  150 - 450 10*3/mm3 Final   • Neutrophil % 07/26/2017 51.5  37.0 - 80.0 % Final   • Lymphocyte % 07/26/2017 31.5  10.0 - 50.0 % Final   • Monocyte % 07/26/2017 14.7* 0.0 - 12.0 % Final   • Eosinophil % 07/26/2017 2.1  0.0 - 7.0 % Final   • Basophil % 07/26/2017 0.2  0.0 - 2.0 % Final   • Neutrophils, Absolute 07/26/2017 2.49  2.00 - 8.60 10*3/mm3 Final   • Lymphocytes, Absolute 07/26/2017 1.52  0.60 - 4.20 10*3/mm3 Final   • Monocytes, Absolute 07/26/2017 0.71  0.00 - 0.90 10*3/mm3 Final   • Eosinophils, Absolute 07/26/2017 0.10  0.00 - 0.70 10*3/mm3 Final   • Basophils, Absolute 07/26/2017 0.01  0.00 - 0.20 10*3/mm3 Final   • Glucose 07/26/2017 93  70 - 100 mg/dL Final   • BUN 07/26/2017 13  8 - 25 mg/dL Final   • Creatinine 07/26/2017 0.80  0.40 - 1.30 mg/dL Final   • Sodium 07/26/2017 130* 134 - 146 mmol/L Final   • Potassium 07/26/2017 3.9  3.4 - 5.4 mmol/L Final   • Chloride 07/26/2017 97* 100 - 112 mmol/L Final   • CO2 07/26/2017 22.0  20.0 - 32.0 mmol/L Final   • Calcium 07/26/2017 9.4  8.4 - 10.8 mg/dL Final   • Total Protein 07/26/2017 8.0  6.7 - 8.2 g/dL Final   • Albumin 07/26/2017 4.60  3.20 - 5.50 g/dL Final   • ALT (SGPT) 07/26/2017 22  10 - 60 U/L Final   • AST (SGOT) 07/26/2017 33  10 - 60 U/L Final   • Alkaline Phosphatase 07/26/2017 68  15  - 121 U/L Final   • Total Bilirubin 07/26/2017 1.2* 0.2 - 1.0 mg/dL Final   • eGFR Non African Amer 07/26/2017 69  39 - 90 mL/min/1.73 Final   • Globulin 07/26/2017 3.4  2.5 - 4.6 gm/dL Final   • A/G Ratio 07/26/2017 1.4  1.0 - 3.0 g/dL Final   • BUN/Creatinine Ratio 07/26/2017 16.3  7.0 - 25.0 Final   • Anion Gap 07/26/2017 11.0  5.0 - 15.0 mmol/L Final   • Ferritin 07/26/2017 94.00  11.10 - 264.00 ng/mL Final   • Total Cholesterol 07/26/2017 139* 150 - 200 mg/dL Final   • Triglycerides 07/26/2017 120  35 - 160 mg/dL Final   • HDL Cholesterol 07/26/2017 52  35 - 100 mg/dL Final   • LDL Cholesterol  07/26/2017 63  mg/dL Final   • VLDL Cholesterol 07/26/2017 24  mg/dL Final   • LDL/HDL Ratio 07/26/2017 1.21   Final   • TSH 07/26/2017 2.180  0.460 - 4.680 mIU/mL Final   ]

## 2017-08-23 RX ORDER — PEN NEEDLE, DIABETIC 30 GX3/16"
1 NEEDLE, DISPOSABLE MISCELLANEOUS DAILY
Qty: 100 EACH | Refills: 3 | Status: SHIPPED | OUTPATIENT
Start: 2017-08-23 | End: 2021-02-05

## 2017-09-08 RX ORDER — OMEPRAZOLE 40 MG/1
CAPSULE, DELAYED RELEASE ORAL
Qty: 90 CAPSULE | Refills: 3 | Status: SHIPPED | OUTPATIENT
Start: 2017-09-08 | End: 2017-12-12 | Stop reason: SDUPTHER

## 2017-10-02 RX ORDER — ALPRAZOLAM 0.5 MG/1
TABLET ORAL
Qty: 60 TABLET | Refills: 0 | Status: SHIPPED | OUTPATIENT
Start: 2017-10-02 | End: 2018-03-09 | Stop reason: SDUPTHER

## 2017-11-22 ENCOUNTER — TRANSCRIBE ORDERS (OUTPATIENT)
Dept: GENERAL RADIOLOGY | Facility: CLINIC | Age: 82
End: 2017-11-22

## 2017-12-12 RX ORDER — ATENOLOL 50 MG/1
TABLET ORAL
Qty: 90 TABLET | Refills: 3 | Status: SHIPPED | OUTPATIENT
Start: 2017-12-12 | End: 2018-03-09 | Stop reason: SDUPTHER

## 2017-12-12 RX ORDER — NAPROXEN 375 MG/1
375 TABLET ORAL DAILY PRN
Qty: 30 TABLET | Refills: 2 | Status: SHIPPED | OUTPATIENT
Start: 2017-12-12 | End: 2018-02-19 | Stop reason: SDUPTHER

## 2017-12-12 RX ORDER — ATORVASTATIN CALCIUM 10 MG/1
TABLET, FILM COATED ORAL
Qty: 90 TABLET | Refills: 3 | Status: SHIPPED | OUTPATIENT
Start: 2017-12-12 | End: 2018-03-09 | Stop reason: SDUPTHER

## 2017-12-12 RX ORDER — OMEPRAZOLE 40 MG/1
CAPSULE, DELAYED RELEASE ORAL
Qty: 90 CAPSULE | Refills: 3 | Status: SHIPPED | OUTPATIENT
Start: 2017-12-12 | End: 2018-03-09 | Stop reason: SDUPTHER

## 2018-01-17 RX ORDER — LISINOPRIL 10 MG/1
TABLET ORAL
Qty: 90 TABLET | Refills: 3 | Status: SHIPPED | OUTPATIENT
Start: 2018-01-17 | End: 2018-03-09 | Stop reason: SDUPTHER

## 2018-01-26 ENCOUNTER — LAB (OUTPATIENT)
Dept: LAB | Facility: OTHER | Age: 83
End: 2018-01-26

## 2018-01-26 DIAGNOSIS — E78.2 MIXED HYPERLIPIDEMIA: Chronic | ICD-10-CM

## 2018-01-26 DIAGNOSIS — M81.0 OSTEOPOROSIS: Chronic | ICD-10-CM

## 2018-01-26 DIAGNOSIS — D50.9 IRON DEFICIENCY ANEMIA, UNSPECIFIED IRON DEFICIENCY ANEMIA TYPE: Chronic | ICD-10-CM

## 2018-01-26 LAB
25(OH)D3 SERPL-MCNC: 72.4 NG/ML (ref 30–100)
ALBUMIN SERPL-MCNC: 4.3 G/DL (ref 3.2–5.5)
ALBUMIN/GLOB SERPL: 1.3 G/DL (ref 1–3)
ALP SERPL-CCNC: 61 U/L (ref 15–121)
ALT SERPL W P-5'-P-CCNC: 21 U/L (ref 10–60)
ANION GAP SERPL CALCULATED.3IONS-SCNC: 8 MMOL/L (ref 5–15)
ARTICHOKE IGE QN: 60 MG/DL (ref 0–129)
AST SERPL-CCNC: 32 U/L (ref 10–60)
BASOPHILS # BLD AUTO: 0.01 10*3/MM3 (ref 0–0.2)
BASOPHILS NFR BLD AUTO: 0.2 % (ref 0–2)
BILIRUB SERPL-MCNC: 1.3 MG/DL (ref 0.2–1)
BUN BLD-MCNC: 8 MG/DL (ref 8–25)
BUN/CREAT SERPL: 10 (ref 7–25)
CALCIUM SPEC-SCNC: 9.2 MG/DL (ref 8.4–10.8)
CHLORIDE SERPL-SCNC: 88 MMOL/L (ref 100–112)
CO2 SERPL-SCNC: 26 MMOL/L (ref 20–32)
CREAT BLD-MCNC: 0.8 MG/DL (ref 0.4–1.3)
DEPRECATED RDW RBC AUTO: 36.8 FL (ref 36.4–46.3)
EOSINOPHIL # BLD AUTO: 0.13 10*3/MM3 (ref 0–0.7)
EOSINOPHIL NFR BLD AUTO: 2.4 % (ref 0–7)
ERYTHROCYTE [DISTWIDTH] IN BLOOD BY AUTOMATED COUNT: 11.8 % (ref 11.5–14.5)
FERRITIN SERPL-MCNC: 160 NG/ML (ref 11.1–264)
GFR SERPL CREATININE-BSD FRML MDRD: 69 ML/MIN/1.73 (ref 39–90)
GLOBULIN UR ELPH-MCNC: 3.2 GM/DL (ref 2.5–4.6)
GLUCOSE BLD-MCNC: 92 MG/DL (ref 70–100)
HCT VFR BLD AUTO: 33.2 % (ref 35–45)
HGB BLD-MCNC: 12 G/DL (ref 12–15.5)
LYMPHOCYTES # BLD AUTO: 1.51 10*3/MM3 (ref 0.6–4.2)
LYMPHOCYTES NFR BLD AUTO: 28.2 % (ref 10–50)
MCH RBC QN AUTO: 31.7 PG (ref 26.5–34)
MCHC RBC AUTO-ENTMCNC: 36.1 G/DL (ref 31.4–36)
MCV RBC AUTO: 87.6 FL (ref 80–98)
MONOCYTES # BLD AUTO: 0.62 10*3/MM3 (ref 0–0.9)
MONOCYTES NFR BLD AUTO: 11.6 % (ref 0–12)
NEUTROPHILS # BLD AUTO: 3.09 10*3/MM3 (ref 2–8.6)
NEUTROPHILS NFR BLD AUTO: 57.6 % (ref 37–80)
PLATELET # BLD AUTO: 192 10*3/MM3 (ref 150–450)
PMV BLD AUTO: 9.3 FL (ref 8–12)
POTASSIUM BLD-SCNC: 4.7 MMOL/L (ref 3.4–5.4)
PROT SERPL-MCNC: 7.5 G/DL (ref 6.7–8.2)
RBC # BLD AUTO: 3.79 10*6/MM3 (ref 3.77–5.16)
SODIUM BLD-SCNC: 122 MMOL/L (ref 134–146)
VIT B12 BLD-MCNC: 486 PG/ML (ref 239–931)
WBC NRBC COR # BLD: 5.36 10*3/MM3 (ref 3.2–9.8)

## 2018-01-26 PROCEDURE — 85025 COMPLETE CBC W/AUTO DIFF WBC: CPT | Performed by: INTERNAL MEDICINE

## 2018-01-26 PROCEDURE — 82306 VITAMIN D 25 HYDROXY: CPT | Performed by: INTERNAL MEDICINE

## 2018-01-26 PROCEDURE — 80053 COMPREHEN METABOLIC PANEL: CPT | Performed by: INTERNAL MEDICINE

## 2018-01-26 PROCEDURE — 36415 COLL VENOUS BLD VENIPUNCTURE: CPT | Performed by: INTERNAL MEDICINE

## 2018-01-26 PROCEDURE — 83721 ASSAY OF BLOOD LIPOPROTEIN: CPT | Performed by: INTERNAL MEDICINE

## 2018-01-26 PROCEDURE — 82728 ASSAY OF FERRITIN: CPT | Performed by: INTERNAL MEDICINE

## 2018-01-26 PROCEDURE — 82607 VITAMIN B-12: CPT | Performed by: INTERNAL MEDICINE

## 2018-02-19 RX ORDER — FERROUS SULFATE 325(65) MG
TABLET ORAL
Qty: 7 TABLET | Refills: 0 | Status: SHIPPED | OUTPATIENT
Start: 2018-02-19 | End: 2018-03-09 | Stop reason: SDUPTHER

## 2018-02-19 RX ORDER — POTASSIUM CHLORIDE 750 MG/1
TABLET, FILM COATED, EXTENDED RELEASE ORAL
Qty: 14 TABLET | Refills: 0 | Status: SHIPPED | OUTPATIENT
Start: 2018-02-19 | End: 2018-03-09 | Stop reason: SDUPTHER

## 2018-02-19 RX ORDER — NAPROXEN 375 MG/1
TABLET ORAL
Qty: 14 TABLET | Refills: 0 | Status: SHIPPED | OUTPATIENT
Start: 2018-02-19 | End: 2018-03-09 | Stop reason: SDUPTHER

## 2018-03-09 ENCOUNTER — CLINICAL SUPPORT (OUTPATIENT)
Dept: FAMILY MEDICINE CLINIC | Facility: CLINIC | Age: 83
End: 2018-03-09

## 2018-03-09 ENCOUNTER — OFFICE VISIT (OUTPATIENT)
Dept: FAMILY MEDICINE CLINIC | Facility: CLINIC | Age: 83
End: 2018-03-09

## 2018-03-09 VITALS
HEIGHT: 59 IN | HEART RATE: 64 BPM | WEIGHT: 129.2 LBS | BODY MASS INDEX: 26.04 KG/M2 | TEMPERATURE: 98 F | DIASTOLIC BLOOD PRESSURE: 80 MMHG | SYSTOLIC BLOOD PRESSURE: 154 MMHG

## 2018-03-09 DIAGNOSIS — D50.8 IRON DEFICIENCY ANEMIA SECONDARY TO INADEQUATE DIETARY IRON INTAKE: Chronic | ICD-10-CM

## 2018-03-09 DIAGNOSIS — F41.1 GENERALIZED ANXIETY DISORDER: Chronic | ICD-10-CM

## 2018-03-09 DIAGNOSIS — K29.30 CHRONIC SUPERFICIAL GASTRITIS WITHOUT BLEEDING: Chronic | ICD-10-CM

## 2018-03-09 DIAGNOSIS — G47.9 SLEEP DISORDER: Chronic | ICD-10-CM

## 2018-03-09 DIAGNOSIS — M81.6 LOCALIZED OSTEOPOROSIS WITHOUT CURRENT PATHOLOGICAL FRACTURE: Chronic | ICD-10-CM

## 2018-03-09 DIAGNOSIS — Z23 PNEUMOCOCCAL VACCINATION ADMINISTERED AT CURRENT VISIT: Primary | ICD-10-CM

## 2018-03-09 DIAGNOSIS — E78.2 MIXED HYPERLIPIDEMIA: Chronic | ICD-10-CM

## 2018-03-09 DIAGNOSIS — R73.01 IMPAIRED FASTING GLUCOSE: Chronic | ICD-10-CM

## 2018-03-09 DIAGNOSIS — I10 ESSENTIAL HYPERTENSION: Primary | Chronic | ICD-10-CM

## 2018-03-09 PROCEDURE — G0009 ADMIN PNEUMOCOCCAL VACCINE: HCPCS | Performed by: INTERNAL MEDICINE

## 2018-03-09 PROCEDURE — 99214 OFFICE O/P EST MOD 30 MIN: CPT | Performed by: INTERNAL MEDICINE

## 2018-03-09 PROCEDURE — 90670 PCV13 VACCINE IM: CPT | Performed by: INTERNAL MEDICINE

## 2018-03-09 RX ORDER — FERROUS SULFATE 325(65) MG
TABLET ORAL
Qty: 45 TABLET | Refills: 3 | Status: SHIPPED | OUTPATIENT
Start: 2018-03-09 | End: 2018-03-09

## 2018-03-09 RX ORDER — NAPROXEN 375 MG/1
TABLET ORAL
Qty: 90 TABLET | Refills: 3 | Status: SHIPPED | OUTPATIENT
Start: 2018-03-09 | End: 2021-09-28

## 2018-03-09 RX ORDER — ATORVASTATIN CALCIUM 10 MG/1
TABLET, FILM COATED ORAL
Qty: 90 TABLET | Refills: 3 | Status: SHIPPED | OUTPATIENT
Start: 2018-03-09 | End: 2018-12-03 | Stop reason: SDUPTHER

## 2018-03-09 RX ORDER — LANOLIN ALCOHOL/MO/W.PET/CERES
1000 CREAM (GRAM) TOPICAL DAILY
COMMUNITY

## 2018-03-09 RX ORDER — OMEPRAZOLE 40 MG/1
CAPSULE, DELAYED RELEASE ORAL
Qty: 90 CAPSULE | Refills: 3 | Status: SHIPPED | OUTPATIENT
Start: 2018-03-09 | End: 2019-01-15 | Stop reason: SDUPTHER

## 2018-03-09 RX ORDER — ALPRAZOLAM 0.5 MG/1
TABLET ORAL
Qty: 60 TABLET | Refills: 2 | Status: SHIPPED | OUTPATIENT
Start: 2018-03-09 | End: 2018-07-23 | Stop reason: SDUPTHER

## 2018-03-09 RX ORDER — ATENOLOL 50 MG/1
TABLET ORAL
Qty: 90 TABLET | Refills: 3 | Status: SHIPPED | OUTPATIENT
Start: 2018-03-09 | End: 2018-12-03 | Stop reason: SDUPTHER

## 2018-03-09 RX ORDER — LISINOPRIL 10 MG/1
TABLET ORAL
Qty: 90 TABLET | Refills: 3 | Status: SHIPPED | OUTPATIENT
Start: 2018-03-09 | End: 2019-04-19 | Stop reason: SDUPTHER

## 2018-03-09 RX ORDER — POTASSIUM CHLORIDE 750 MG/1
10 TABLET, FILM COATED, EXTENDED RELEASE ORAL DAILY
Qty: 90 TABLET | Refills: 3 | Status: SHIPPED | OUTPATIENT
Start: 2018-03-09 | End: 2018-12-10 | Stop reason: SDUPTHER

## 2018-03-09 NOTE — PROGRESS NOTES
Subjective        History of Present Illness     Brittney Rollins is a 83 y.o. female who presents for six-month follow up on hyperlipidemia, hypertension, GERD, osteoporosis, REY, and hypokalemia among other issues.  GERD symptoms adequately controlled on omeprazole.      We scheduled repeat noncontrast CT scan of the chest  to re-evaluate stability of the incidental finding of 8 mm left pulmonary nodule.  Repeat CT 11/2017 scan of the lung reveals no changes compared to the study performed 3-4 months prior.  Repeat was recommended in nine months.  We will plan on scheduling repeat CT with her next 6-month routine follow up visit.      She continues to take Xanax chronically for anxiety.  I have unsuccessfully tried nonaddictive medications in the past.  She denies significant side effects including excessive daytime drowsiness or hallucinations.      She is overdue for routine mammogram.  She had to cancel her appointment due to illness.  She is asking if additional mammograms are recommended.  I recommended one additional mammogram.      DEXA revealed severe osteopenia of lumbar spine.  We discussed treatment options for the osteoporosis at her visit six months ago.  She has been intolerant to the Fosamax and Prolia in the past. When she was here six months ago, we discussed Forteo injections as well as fall risks and precautions.   After discussing her treatment options, she was to consider her options  and notify me of her choice of treatment.  She has decided to continue on calcium and vitamin D supplements.  We will repeat DEXA  summer of 2019 and review options at that time.      Blood pressure is above goal today on her current lisinopril 10 mg.  She has a blood pressure cuff at home, but has not been checking it.  I recommended she monitor at home and notify me if consistently above goal.        She had Pneumovax in the past.  I recommended Prevnar and she is in agreement.      Weight is up 6 pounds in the  "past six months.       Labs reveal hyponatremia.  We will continue to monitor.  She reports drinking a lot of [employer]Wilmer Pepper soft drinks.  I recommended she cut back on the soft drinks.      Iron levels are above goal with oral iron q.o.d.  She can stop the oral iron and we will continue to monitor.      The patient's relevant past medical, surgical, and social history was reviewed in Epic.  Lab results are reviewed with the patient today.  CBC unremarkable.  Fasting glucose 92.  Vitamin B-12 is low normal with oral supplement.  Vitamin D is at goal.       Review of Systems   Constitutional: Negative for chills, fatigue and fever.   HENT: Negative for congestion, ear pain, postnasal drip, sinus pressure and sore throat.    Respiratory: Negative for cough, shortness of breath and wheezing.    Cardiovascular: Negative for chest pain, palpitations and leg swelling.   Gastrointestinal: Negative for abdominal pain, blood in stool, constipation, diarrhea, nausea and vomiting.   Endocrine: Negative for cold intolerance, heat intolerance, polydipsia and polyuria.   Genitourinary: Negative for dysuria, frequency, hematuria and urgency.   Skin: Negative for rash.   Neurological: Negative for syncope and weakness.        Objective     Vitals:    03/09/18 1348   BP: 154/80   Pulse: 64   Temp: 98 °F (36.7 °C)   TempSrc: Oral   Weight: 58.6 kg (129 lb 3.2 oz)   Height: 149.9 cm (59\")     Physical Exam   Constitutional: She is oriented to person, place, and time. She appears well-developed and well-nourished. No distress.   HENT:   Head: Normocephalic and atraumatic.   Nose: Right sinus exhibits no maxillary sinus tenderness and no frontal sinus tenderness. Left sinus exhibits no maxillary sinus tenderness and no frontal sinus tenderness.   Mouth/Throat: Uvula is midline, oropharynx is clear and moist and mucous membranes are normal. No oral lesions. No tonsillar exudate.   Eyes: Conjunctivae and EOM are normal. Pupils are equal, " round, and reactive to light.   Neck: Trachea normal. Neck supple. No JVD present. Carotid bruit is not present. No tracheal deviation present. No thyroid mass and no thyromegaly present.   Cardiovascular: Normal rate, regular rhythm, normal heart sounds and intact distal pulses.   No extrasystoles are present. PMI is not displaced.    No murmur heard.  Pulmonary/Chest: Effort normal and breath sounds normal. No accessory muscle usage. No respiratory distress. She has no decreased breath sounds. She has no wheezes. She has no rhonchi. She has no rales.   Abdominal: Soft. Bowel sounds are normal. She exhibits no distension. There is no hepatosplenomegaly. There is no tenderness.       Vascular Status -  Her exam exhibits right foot vasculature normal. Her exam exhibits no right foot edema. Her exam exhibits left foot vasculature normal. Her exam exhibits no left foot edema.  Lymphadenopathy:     She has no cervical adenopathy.   Neurological: She is alert and oriented to person, place, and time. No cranial nerve deficit. Coordination normal.   Skin: Skin is warm, dry and intact. No rash noted. No cyanosis. Nails show no clubbing.   Psychiatric: She has a normal mood and affect. Her speech is normal and behavior is normal. Judgment and thought content normal.   Vitals reviewed.      Assessment/Plan      She can stop the oral iron therapy.  We will continue to monitor.      She will have Prevnar on the way out today.      I recommended she decrease the amount of caffeinated soft drinks she is consuming due to the hyponatremia.  We will continue to monitor.      Continue the lisinopril.  Monitor blood pressure and notify me if consistently above goal.       She will schedule a repeat mammogram.       Refill is sent for Xanax 0.5 mg to take 1/2 to 1 tab bid.  Patient understands the risks associated with this controlled medication, including tolerance and addiction.  She also agrees to only obtain this medication from  me, and not from a another provider, unless that provider is covering for me in my absence.  She also agrees to be compliant in dosing, and not self adjust the dose of medication.  A signed controlled substance agreement is on file, and she has received a controlled substance education sheet at this a previous visit.  She has also signed a consent for treatment with a controlled substance as per [facility] policy. CONCHA was obtained.    Continue other medications and vitamin and mineral supplements to treat additional medical problems which we addressed today.  Return in six months for follow up with fasting labs one week prior.  We will plan on scheduling a repeat CT next fall to document stability of the pulmonary nodule seen as an incidental finding.         Scribed for Dr. Dillon by Sophie Méndez [facility].      Diagnoses and all orders for this visit:    Essential hypertension  -     Vitamin B12; Future  -     CBC Auto Differential; Future  -     Comprehensive Metabolic Panel; Future  -     Ferritin; Future  -     Hemoglobin A1c; Future  -     Lipid Panel; Future  -     Vitamin D 25 Hydroxy; Future  -     TSH; Future    Mixed hyperlipidemia  -     Lipid Panel; Future  -     TSH; Future    Impaired fasting glucose  -     Hemoglobin A1c; Future    Chronic superficial gastritis without bleeding    Iron deficiency anemia secondary to inadequate dietary iron intake  -     Vitamin B12; Future  -     CBC Auto Differential; Future  -     Ferritin; Future    Localized osteoporosis without current pathological fracture  -     Vitamin D 25 Hydroxy; Future    Generalized anxiety disorder    Sleep disorder    Other orders  -     vitamin B-12 (CYANOCOBALAMIN) 1000 MCG tablet; Take 1,000 mcg by mouth Daily.  -     Biotin 30537 MCG tablet dispersible; Take 1 tablet by mouth Daily.  -     Discontinue: ferrous sulfate 325 (65 FE) MG tablet; Take one tab every other day  -     naproxen (NAPROSYN) 375 MG tablet; Take one tab  daily with food prn mild pain  -     potassium chloride (K-DUR) 10 MEQ CR tablet; Take 1 tablet by mouth Daily.  -     atorvastatin (LIPITOR) 10 MG tablet; Take one tab daily for cholesterol  -     lisinopril (PRINIVIL,ZESTRIL) 10 MG tablet; Take one tab daily for blood pressure  -     atenolol (TENORMIN) 50 MG tablet; Take one tab daily for blood pressure and heart  -     ALPRAZolam (XANAX) 0.5 MG tablet; 1/2 to 1 tab bid  -     omeprazole (priLOSEC) 40 MG capsule; Take one capsule every morning      No visits with results within 3 Week(s) from this visit.  Latest known visit with results is:    Lab on 01/26/2018   Component Date Value Ref Range Status   • Vitamin B-12 01/26/2018 486  239 - 931 pg/mL Final   • WBC 01/26/2018 5.36  3.20 - 9.80 10*3/mm3 Final   • RBC 01/26/2018 3.79  3.77 - 5.16 10*6/mm3 Final   • Hemoglobin 01/26/2018 12.0  12.0 - 15.5 g/dL Final   • Hematocrit 01/26/2018 33.2* 35.0 - 45.0 % Final   • MCV 01/26/2018 87.6  80.0 - 98.0 fL Final   • MCH 01/26/2018 31.7  26.5 - 34.0 pg Final   • MCHC 01/26/2018 36.1* 31.4 - 36.0 g/dL Final   • RDW 01/26/2018 11.8  11.5 - 14.5 % Final   • RDW-SD 01/26/2018 36.8  36.4 - 46.3 fl Final   • MPV 01/26/2018 9.3  8.0 - 12.0 fL Final   • Platelets 01/26/2018 192  150 - 450 10*3/mm3 Final   • Neutrophil % 01/26/2018 57.6  37.0 - 80.0 % Final   • Lymphocyte % 01/26/2018 28.2  10.0 - 50.0 % Final   • Monocyte % 01/26/2018 11.6  0.0 - 12.0 % Final   • Eosinophil % 01/26/2018 2.4  0.0 - 7.0 % Final   • Basophil % 01/26/2018 0.2  0.0 - 2.0 % Final   • Neutrophils, Absolute 01/26/2018 3.09  2.00 - 8.60 10*3/mm3 Final   • Lymphocytes, Absolute 01/26/2018 1.51  0.60 - 4.20 10*3/mm3 Final   • Monocytes, Absolute 01/26/2018 0.62  0.00 - 0.90 10*3/mm3 Final   • Eosinophils, Absolute 01/26/2018 0.13  0.00 - 0.70 10*3/mm3 Final   • Basophils, Absolute 01/26/2018 0.01  0.00 - 0.20 10*3/mm3 Final   • Glucose 01/26/2018 92  70 - 100 mg/dL Final   • BUN 01/26/2018 8  8 - 25 mg/dL  Final   • Creatinine 01/26/2018 0.80  0.40 - 1.30 mg/dL Final   • Sodium 01/26/2018 122* 134 - 146 mmol/L Final   • Potassium 01/26/2018 4.7  3.4 - 5.4 mmol/L Final   • Chloride 01/26/2018 88* 100 - 112 mmol/L Final   • CO2 01/26/2018 26.0  20.0 - 32.0 mmol/L Final   • Calcium 01/26/2018 9.2  8.4 - 10.8 mg/dL Final   • Total Protein 01/26/2018 7.5  6.7 - 8.2 g/dL Final   • Albumin 01/26/2018 4.30  3.20 - 5.50 g/dL Final   • ALT (SGPT) 01/26/2018 21  10 - 60 U/L Final   • AST (SGOT) 01/26/2018 32  10 - 60 U/L Final   • Alkaline Phosphatase 01/26/2018 61  15 - 121 U/L Final   • Total Bilirubin 01/26/2018 1.3* 0.2 - 1.0 mg/dL Final   • eGFR Non African Amer 01/26/2018 69  39 - 90 mL/min/1.73 Final   • Globulin 01/26/2018 3.2  2.5 - 4.6 gm/dL Final   • A/G Ratio 01/26/2018 1.3  1.0 - 3.0 g/dL Final   • BUN/Creatinine Ratio 01/26/2018 10.0  7.0 - 25.0 Final   • Anion Gap 01/26/2018 8.0  5.0 - 15.0 mmol/L Final   • Ferritin 01/26/2018 160.00  11.10 - 264.00 ng/mL Final   • LDL Cholesterol  01/26/2018 60  0 - 129 mg/dL Final   • 25 Hydroxy, Vitamin D 01/26/2018 72.4  30.0 - 100.0 ng/ml Final   ]

## 2018-06-14 RX ORDER — MECLIZINE HYDROCHLORIDE 25 MG/1
TABLET ORAL
Qty: 30 TABLET | Refills: 1 | Status: SHIPPED | OUTPATIENT
Start: 2018-06-14 | End: 2018-07-16 | Stop reason: SDUPTHER

## 2018-07-16 RX ORDER — MECLIZINE HYDROCHLORIDE 25 MG/1
TABLET ORAL
Qty: 30 TABLET | Refills: 1 | Status: SHIPPED | OUTPATIENT
Start: 2018-07-16 | End: 2019-08-30 | Stop reason: SDUPTHER

## 2018-07-23 ENCOUNTER — OFFICE VISIT (OUTPATIENT)
Dept: FAMILY MEDICINE CLINIC | Facility: CLINIC | Age: 83
End: 2018-07-23

## 2018-07-23 VITALS
HEART RATE: 68 BPM | OXYGEN SATURATION: 98 % | DIASTOLIC BLOOD PRESSURE: 78 MMHG | WEIGHT: 124 LBS | HEIGHT: 59 IN | BODY MASS INDEX: 25 KG/M2 | SYSTOLIC BLOOD PRESSURE: 124 MMHG | TEMPERATURE: 97.7 F
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
## 2018-07-23 DIAGNOSIS — R53.83 FATIGUE, UNSPECIFIED TYPE: ICD-10-CM

## 2018-07-23 DIAGNOSIS — G47.9 SLEEP DISORDER: Chronic | ICD-10-CM

## 2018-07-23 DIAGNOSIS — F41.1 GENERALIZED ANXIETY DISORDER: Primary | Chronic | ICD-10-CM

## 2018-07-23 PROCEDURE — 99214 OFFICE O/P EST MOD 30 MIN: CPT | Performed by: INTERNAL MEDICINE

## 2018-07-23 RX ORDER — ALPRAZOLAM 0.5 MG/1
TABLET ORAL
Qty: 60 TABLET | Refills: 2 | Status: SHIPPED | OUTPATIENT
Start: 2018-07-23 | End: 2018-12-18 | Stop reason: SDUPTHER

## 2018-07-23 RX ORDER — FERROUS SULFATE 325(65) MG
1 TABLET ORAL EVERY OTHER DAY
Refills: 3 | COMMUNITY
Start: 2018-05-21 | End: 2018-09-11

## 2018-07-23 NOTE — PROGRESS NOTES
"Subjective     History of Present Illness     Brittney Rollins is a 83 y.o. female who comes in for re-evaluation of REY. She continues to take Xanax chronically for anxiety/sleep.  She takes 1/2 tablet most days or nights.  I have unsuccessfully tried to get her to take nonaddictive medications in the past.  She denies significant side effects including excessive daytime drowsiness or hallucinations.  She reports she has been a little \"draggy\" this summer.  I recommended she try to increase some physical activity to help improve these nonspecific fatigue symptoms.    She has a pruritic insect bite to the lower right anterior ankle/distal shin.  She thinks it was a tick bite, but not sure.   She has been applying Neosporin and scratching it excessively.  There is no evidence of infection.  I recommended use of OTC hydrocortisone cream to help with pruritis.      Review of Systems   Constitutional: Positive for fatigue. Negative for chills and fever.   HENT: Negative for congestion, ear pain, postnasal drip, sinus pressure and sore throat.    Respiratory: Negative for cough, shortness of breath and wheezing.    Cardiovascular: Negative for chest pain, palpitations and leg swelling.   Gastrointestinal: Negative for abdominal pain, blood in stool, constipation, diarrhea, nausea and vomiting.   Endocrine: Negative for cold intolerance, heat intolerance, polydipsia and polyuria.   Genitourinary: Negative for dysuria, frequency, hematuria and urgency.   Skin: Negative for rash.   Neurological: Positive for headaches. Negative for syncope and weakness.        Objective     Vitals:    07/23/18 1312   BP: 124/78   Pulse: 68   Temp: 97.7 °F (36.5 °C)   SpO2: 98%   Weight: 56.2 kg (124 lb)   Height: 149.9 cm (59\")     Physical Exam   Constitutional: She is oriented to person, place, and time. She appears well-developed and well-nourished. No distress.   HENT:   Head: Normocephalic and atraumatic.   Nose: Nose normal. "   Mouth/Throat: Oropharynx is clear and moist. No oropharyngeal exudate.   Eyes: Pupils are equal, round, and reactive to light. EOM are normal.   Neck: Neck supple. No JVD present. No thyromegaly present.   Cardiovascular: Normal rate, regular rhythm and normal heart sounds.    Pulmonary/Chest: Effort normal and breath sounds normal. No accessory muscle usage. No respiratory distress. She has no wheezes. She has no rales.   Abdominal: Soft. Bowel sounds are normal. She exhibits no distension. There is no tenderness.   Musculoskeletal: She exhibits no edema.   Lymphadenopathy:     She has no cervical adenopathy.   Neurological: She is alert and oriented to person, place, and time. No cranial nerve deficit.   Skin:   Insect bite to the right shin.  No surrounding cellulitis.   Psychiatric: She has a normal mood and affect. Her speech is normal and behavior is normal. Judgment and thought content normal.     Future Appointments  Date Time Provider Department Center   9/11/2018 2:00 PM Pollo Dillon MD [facility] None         Assessment/Plan      Refill is sent for Xanax 0.5 mg to take 1/2 to 1 tab bid.  Patient understands the risks associated with this controlled medication, including tolerance and addiction.  She also agrees to only obtain this medication from me, and not from a another provider, unless that provider is covering for me in my absence. She also agrees to be compliant in dosing, and not self adjust the dose of medication.  A signed controlled substance agreement is on file, and she has received a controlled substance education sheet at this a previous visit.  She has also signed a consent for treatment with a controlled substance as per [facility] policy. CONCHA was obtained.    Recommended she increase physical activity to help with conditioning and improve fatigue symptoms.      I recommended she stop the Neosporin and apply OTC hydrocortisone for the pruritic insect bite to the lower right  anterior shin.  Try to stop scratching.    Return in September for routine follow up with fasting labs one week prior.     Scribed for Dr. Dillon by Sophie Méndez [facility].     Diagnoses and all orders for this visit:    Generalized anxiety disorder    Sleep disorder    Insect bite, initial encounter    Fatigue, unspecified type    Other orders  -     ferrous sulfate 325 (65 FE) MG tablet; Take 1 tablet by mouth Every Other Day.  -     ALPRAZolam (XANAX) 0.5 MG tablet; 1/2 to 1 tab bid        No visits with results within 3 Week(s) from this visit.   Latest known visit with results is:   Lab on 01/26/2018   Component Date Value Ref Range Status   • Vitamin B-12 01/26/2018 486  239 - 931 pg/mL Final   • WBC 01/26/2018 5.36  3.20 - 9.80 10*3/mm3 Final   • RBC 01/26/2018 3.79  3.77 - 5.16 10*6/mm3 Final   • Hemoglobin 01/26/2018 12.0  12.0 - 15.5 g/dL Final   • Hematocrit 01/26/2018 33.2* 35.0 - 45.0 % Final   • MCV 01/26/2018 87.6  80.0 - 98.0 fL Final   • MCH 01/26/2018 31.7  26.5 - 34.0 pg Final   • MCHC 01/26/2018 36.1* 31.4 - 36.0 g/dL Final   • RDW 01/26/2018 11.8  11.5 - 14.5 % Final   • RDW-SD 01/26/2018 36.8  36.4 - 46.3 fl Final   • MPV 01/26/2018 9.3  8.0 - 12.0 fL Final   • Platelets 01/26/2018 192  150 - 450 10*3/mm3 Final   • Neutrophil % 01/26/2018 57.6  37.0 - 80.0 % Final   • Lymphocyte % 01/26/2018 28.2  10.0 - 50.0 % Final   • Monocyte % 01/26/2018 11.6  0.0 - 12.0 % Final   • Eosinophil % 01/26/2018 2.4  0.0 - 7.0 % Final   • Basophil % 01/26/2018 0.2  0.0 - 2.0 % Final   • Neutrophils, Absolute 01/26/2018 3.09  2.00 - 8.60 10*3/mm3 Final   • Lymphocytes, Absolute 01/26/2018 1.51  0.60 - 4.20 10*3/mm3 Final   • Monocytes, Absolute 01/26/2018 0.62  0.00 - 0.90 10*3/mm3 Final   • Eosinophils, Absolute 01/26/2018 0.13  0.00 - 0.70 10*3/mm3 Final   • Basophils, Absolute 01/26/2018 0.01  0.00 - 0.20 10*3/mm3 Final   • Glucose 01/26/2018 92  70 - 100 mg/dL Final   • BUN 01/26/2018 8  8 - 25 mg/dL Final    • Creatinine 01/26/2018 0.80  0.40 - 1.30 mg/dL Final   • Sodium 01/26/2018 122* 134 - 146 mmol/L Final   • Potassium 01/26/2018 4.7  3.4 - 5.4 mmol/L Final   • Chloride 01/26/2018 88* 100 - 112 mmol/L Final   • CO2 01/26/2018 26.0  20.0 - 32.0 mmol/L Final   • Calcium 01/26/2018 9.2  8.4 - 10.8 mg/dL Final   • Total Protein 01/26/2018 7.5  6.7 - 8.2 g/dL Final   • Albumin 01/26/2018 4.30  3.20 - 5.50 g/dL Final   • ALT (SGPT) 01/26/2018 21  10 - 60 U/L Final   • AST (SGOT) 01/26/2018 32  10 - 60 U/L Final   • Alkaline Phosphatase 01/26/2018 61  15 - 121 U/L Final   • Total Bilirubin 01/26/2018 1.3* 0.2 - 1.0 mg/dL Final   • eGFR Non African Amer 01/26/2018 69  39 - 90 mL/min/1.73 Final   • Globulin 01/26/2018 3.2  2.5 - 4.6 gm/dL Final   • A/G Ratio 01/26/2018 1.3  1.0 - 3.0 g/dL Final   • BUN/Creatinine Ratio 01/26/2018 10.0  7.0 - 25.0 Final   • Anion Gap 01/26/2018 8.0  5.0 - 15.0 mmol/L Final   • Ferritin 01/26/2018 160.00  11.10 - 264.00 ng/mL Final   • LDL Cholesterol  01/26/2018 60  0 - 129 mg/dL Final   • 25 Hydroxy, Vitamin D 01/26/2018 72.4  30.0 - 100.0 ng/ml Final   ]

## 2018-09-05 ENCOUNTER — LAB (OUTPATIENT)
Dept: LAB | Facility: OTHER | Age: 83
End: 2018-09-05

## 2018-09-05 DIAGNOSIS — E78.2 MIXED HYPERLIPIDEMIA: Chronic | ICD-10-CM

## 2018-09-05 DIAGNOSIS — M81.6 LOCALIZED OSTEOPOROSIS WITHOUT CURRENT PATHOLOGICAL FRACTURE: Chronic | ICD-10-CM

## 2018-09-05 DIAGNOSIS — I10 ESSENTIAL HYPERTENSION: Chronic | ICD-10-CM

## 2018-09-05 DIAGNOSIS — D50.8 IRON DEFICIENCY ANEMIA SECONDARY TO INADEQUATE DIETARY IRON INTAKE: Chronic | ICD-10-CM

## 2018-09-05 DIAGNOSIS — R73.01 IMPAIRED FASTING GLUCOSE: Chronic | ICD-10-CM

## 2018-09-05 LAB
25(OH)D3 SERPL-MCNC: 66.9 NG/ML (ref 30–100)
ALBUMIN SERPL-MCNC: 4.3 G/DL (ref 3.4–4.8)
ALBUMIN/GLOB SERPL: 1.4 G/DL (ref 1.1–1.8)
ALP SERPL-CCNC: 65 U/L (ref 38–126)
ALT SERPL W P-5'-P-CCNC: 37 U/L (ref 9–52)
ANION GAP SERPL CALCULATED.3IONS-SCNC: 10 MMOL/L (ref 5–15)
ARTICHOKE IGE QN: 51 MG/DL (ref 1–129)
AST SERPL-CCNC: 55 U/L (ref 14–36)
BASOPHILS # BLD AUTO: 0.01 10*3/MM3 (ref 0–0.2)
BASOPHILS NFR BLD AUTO: 0.3 % (ref 0–2)
BILIRUB SERPL-MCNC: 0.8 MG/DL (ref 0.2–1.3)
BUN BLD-MCNC: 11 MG/DL (ref 7–21)
BUN/CREAT SERPL: 12.1 (ref 7–25)
CALCIUM SPEC-SCNC: 9.5 MG/DL (ref 8.4–10.2)
CHLORIDE SERPL-SCNC: 97 MMOL/L (ref 95–110)
CHOLEST SERPL-MCNC: 134 MG/DL (ref 0–199)
CO2 SERPL-SCNC: 24 MMOL/L (ref 22–31)
CREAT BLD-MCNC: 0.91 MG/DL (ref 0.5–1)
DEPRECATED RDW RBC AUTO: 39.5 FL (ref 36.4–46.3)
EOSINOPHIL # BLD AUTO: 0.1 10*3/MM3 (ref 0–0.7)
EOSINOPHIL NFR BLD AUTO: 2.7 % (ref 0–7)
ERYTHROCYTE [DISTWIDTH] IN BLOOD BY AUTOMATED COUNT: 12.1 % (ref 11.5–14.5)
FERRITIN SERPL-MCNC: 172 NG/ML (ref 11.1–264)
GFR SERPL CREATININE-BSD FRML MDRD: 59 ML/MIN/1.73 (ref 39–90)
GLOBULIN UR ELPH-MCNC: 3.1 GM/DL (ref 2.3–3.5)
GLUCOSE BLD-MCNC: 102 MG/DL (ref 60–100)
HBA1C MFR BLD: 5.6 % (ref 4–5.6)
HCT VFR BLD AUTO: 34.2 % (ref 35–45)
HDLC SERPL-MCNC: 48 MG/DL (ref 60–200)
HGB BLD-MCNC: 11.8 G/DL (ref 12–15.5)
LDLC/HDLC SERPL: 1.29 {RATIO} (ref 0–3.22)
LYMPHOCYTES # BLD AUTO: 1.45 10*3/MM3 (ref 0.6–4.2)
LYMPHOCYTES NFR BLD AUTO: 38.6 % (ref 10–50)
MCH RBC QN AUTO: 31.8 PG (ref 26.5–34)
MCHC RBC AUTO-ENTMCNC: 34.5 G/DL (ref 31.4–36)
MCV RBC AUTO: 92.2 FL (ref 80–98)
MONOCYTES # BLD AUTO: 0.39 10*3/MM3 (ref 0–0.9)
MONOCYTES NFR BLD AUTO: 10.4 % (ref 0–12)
NEUTROPHILS # BLD AUTO: 1.81 10*3/MM3 (ref 2–8.6)
NEUTROPHILS NFR BLD AUTO: 48 % (ref 37–80)
PLATELET # BLD AUTO: 155 10*3/MM3 (ref 150–450)
PMV BLD AUTO: 8.7 FL (ref 8–12)
POTASSIUM BLD-SCNC: 4.2 MMOL/L (ref 3.5–5.1)
PROT SERPL-MCNC: 7.4 G/DL (ref 6.3–8.6)
RBC # BLD AUTO: 3.71 10*6/MM3 (ref 3.77–5.16)
SODIUM BLD-SCNC: 131 MMOL/L (ref 137–145)
TRIGL SERPL-MCNC: 120 MG/DL (ref 20–199)
TSH SERPL DL<=0.05 MIU/L-ACNC: 4.48 MIU/ML (ref 0.46–4.68)
VIT B12 BLD-MCNC: 805 PG/ML (ref 239–931)
WBC NRBC COR # BLD: 3.76 10*3/MM3 (ref 3.2–9.8)

## 2018-09-05 PROCEDURE — 82306 VITAMIN D 25 HYDROXY: CPT | Performed by: INTERNAL MEDICINE

## 2018-09-05 PROCEDURE — 82607 VITAMIN B-12: CPT | Performed by: INTERNAL MEDICINE

## 2018-09-05 PROCEDURE — 82728 ASSAY OF FERRITIN: CPT | Performed by: INTERNAL MEDICINE

## 2018-09-05 PROCEDURE — 83036 HEMOGLOBIN GLYCOSYLATED A1C: CPT | Performed by: INTERNAL MEDICINE

## 2018-09-05 PROCEDURE — 36415 COLL VENOUS BLD VENIPUNCTURE: CPT | Performed by: INTERNAL MEDICINE

## 2018-09-05 PROCEDURE — 80061 LIPID PANEL: CPT | Performed by: INTERNAL MEDICINE

## 2018-09-05 PROCEDURE — 84443 ASSAY THYROID STIM HORMONE: CPT | Performed by: INTERNAL MEDICINE

## 2018-09-05 PROCEDURE — 85025 COMPLETE CBC W/AUTO DIFF WBC: CPT | Performed by: INTERNAL MEDICINE

## 2018-09-05 PROCEDURE — 80053 COMPREHEN METABOLIC PANEL: CPT | Performed by: INTERNAL MEDICINE

## 2018-09-11 ENCOUNTER — OFFICE VISIT (OUTPATIENT)
Dept: FAMILY MEDICINE CLINIC | Facility: CLINIC | Age: 83
End: 2018-09-11

## 2018-09-11 VITALS
TEMPERATURE: 98.8 F | DIASTOLIC BLOOD PRESSURE: 70 MMHG | HEIGHT: 59 IN | WEIGHT: 123.2 LBS | SYSTOLIC BLOOD PRESSURE: 138 MMHG | HEART RATE: 68 BPM | BODY MASS INDEX: 24.84 KG/M2

## 2018-09-11 DIAGNOSIS — M81.6 LOCALIZED OSTEOPOROSIS WITHOUT CURRENT PATHOLOGICAL FRACTURE: Chronic | ICD-10-CM

## 2018-09-11 DIAGNOSIS — R73.01 IMPAIRED FASTING GLUCOSE: Chronic | ICD-10-CM

## 2018-09-11 DIAGNOSIS — R91.1 NODULE OF LEFT LUNG: Chronic | ICD-10-CM

## 2018-09-11 DIAGNOSIS — I10 ESSENTIAL HYPERTENSION: Primary | Chronic | ICD-10-CM

## 2018-09-11 DIAGNOSIS — D50.8 IRON DEFICIENCY ANEMIA SECONDARY TO INADEQUATE DIETARY IRON INTAKE: Chronic | ICD-10-CM

## 2018-09-11 DIAGNOSIS — E78.2 MIXED HYPERLIPIDEMIA: Chronic | ICD-10-CM

## 2018-09-11 PROCEDURE — 99214 OFFICE O/P EST MOD 30 MIN: CPT | Performed by: INTERNAL MEDICINE

## 2018-09-11 RX ORDER — FERROUS SULFATE 325(65) MG
1 TABLET ORAL WEEKLY
Refills: 3 | COMMUNITY
Start: 2018-09-11 | End: 2020-09-01

## 2018-09-11 NOTE — PROGRESS NOTES
Subjective        History of Present Illness     Brittney Rollins is a 83 y.o. female who presents for  six-month follow up on hyperlipidemia, hypertension, GERD, osteoporosis, REY, and hypokalemia among other issues.  She continues to take Xanax chronically for anxiety.  I have unsuccessfully tried nonaddictive medications in the past.    DEXA 08/2017 revealed severe osteopenia of lumbar spine.  She has been intolerant to the Fosamax and Prolia in the past.  She has decided to continue on calcium and vitamin D supplements without prescription osteoporosis medication.  We will repeat DEXA next summer, 2019, and review options at that time.      She had Prevnar 03/2018.  We will plan on giving Pneumovax next spring 03/2019.     She reports a tingling sensation in her left arm episodically, which resolves with changing position of the arm.  Radial and ulnar pulses are adequate.  She denies upper extremity claudication.    We scheduled repeat noncontrast CT scan of the chest  to re-evaluate stability of the incidental finding of 8 mm left pulmonary nodule.  Repeat CT 11/2017 scan of the lung revealed no changes compared to the study performed 3-4 months prior.  Repeat was recommended in nine months.  We will schedule repeat non-contrast CT to re-evaluate.      We also continue to follow an ascending aortic aneurysm.   CT with contrast 08/2017 revealed aortic aneursym measuring 4.3 cm.  She declines repeat contrast CT to evaluate this year, but we will address this in the future, if she is willing.  She is asymptomatic.    She has not taken her oral iron for the past 2-3 weeks.  Iron levels are normal with q.o.d. Iron.  I recommended she decrease iron to once weekly.     Weight is down 6 pounds in the past six months.  Blood pressure is at goal.     The patient's relevant past medical, surgical, and social history was reviewed in Epic.   Lab results are reviewed with the patient today.  CBC unremarkable. Fasting glucose  "102.  Patient reports she ate a small piece of candy about 2 hours prior to her labs.  A1c is 5.6. Vitamin B-12 and vitamin D at goal.  Thyroid normal.   Total cholesterol 134 on daily Lipitor. HDL 48.  LDL 51.  Triglycerides 120.    Review of Systems   Constitutional: Negative for chills, fatigue and fever.   HENT: Negative for congestion, ear pain, postnasal drip, sinus pressure and sore throat.    Respiratory: Negative for cough, shortness of breath and wheezing.    Cardiovascular: Negative for chest pain, palpitations and leg swelling.   Gastrointestinal: Negative for abdominal pain, blood in stool, constipation, diarrhea, nausea and vomiting.   Endocrine: Negative for cold intolerance, heat intolerance, polydipsia and polyuria.   Genitourinary: Negative for dysuria, frequency, hematuria and urgency.   Skin: Negative for rash.   Neurological: Negative for syncope and weakness.          Objective     Vitals:    09/11/18 1340   BP: 138/70   Pulse: 68   Temp: 98.8 °F (37.1 °C)   TempSrc: Oral   Weight: 55.9 kg (123 lb 3.2 oz)   Height: 149.9 cm (59\")     Physical Exam   Constitutional: She is oriented to person, place, and time. She appears well-developed and well-nourished. No distress.   HENT:   Head: Normocephalic and atraumatic.   Nose: Right sinus exhibits no maxillary sinus tenderness and no frontal sinus tenderness. Left sinus exhibits no maxillary sinus tenderness and no frontal sinus tenderness.   Mouth/Throat: Uvula is midline, oropharynx is clear and moist and mucous membranes are normal. No oral lesions. No tonsillar exudate.   Eyes: Pupils are equal, round, and reactive to light. Conjunctivae and EOM are normal.   Neck: Trachea normal. Neck supple. No JVD present. Carotid bruit is not present. No tracheal deviation present. No thyroid mass and no thyromegaly present.   Cardiovascular: Normal rate, regular rhythm, normal heart sounds and intact distal pulses.   No extrasystoles are present. PMI is not " displaced.    No murmur heard.  Pulmonary/Chest: Effort normal and breath sounds normal. No accessory muscle usage. No respiratory distress. She has no decreased breath sounds. She has no wheezes. She has no rhonchi. She has no rales.   Abdominal: Soft. Bowel sounds are normal. She exhibits no distension. There is no hepatosplenomegaly. There is no tenderness.     Vascular Status -  Her right foot exhibits normal foot vasculature  and no edema. Her left foot exhibits normal foot vasculature  and no edema.  Lymphadenopathy:     She has no cervical adenopathy.   Neurological: She is alert and oriented to person, place, and time. No cranial nerve deficit. Coordination normal.   Skin: Skin is warm, dry and intact. No rash noted. No cyanosis. Nails show no clubbing.   Psychiatric: She has a normal mood and affect. Her speech is normal and behavior is normal. Judgment and thought content normal.   Vitals reviewed.        Assessment/Plan      She will be due Pneumovax next spring, 2019.    Decrease iron supplement (ferrous sulfate) to once weekly.     Continue Lipitor 10 mg daily and dietary efforts.    Continue lisinopril and atenolol and sodium restriction.     An order is placed for patient to schedule noncontrast CT scan of the chest  to re-evaluate stability of the incidental finding of 8 mm left pulmonary nodule.  She declines repeat contrast CT to evaluate the aortic aneursym measuring 4.3 cm this year, but we will address this in the future.      We will repeat DEXA in the spring 2019 to re-evaluate severe osteopenia of lumbar spine  She had chosen to continue on calcium and vitamin D supplements in the interim.  We will review options after DEXA results next spring.      Return in six months for follow up with fasting labs one week prior.      Scribed for Dr. Dillon by Sophie Méndez [facility].     Diagnoses and all orders for this visit:    Essential hypertension  -     CBC Auto Differential; Future  -      Comprehensive Metabolic Panel; Future    Mixed hyperlipidemia  -     LDL Cholesterol, Direct; Future    Nodule of left lung  -     CT Chest Without Contrast    Impaired fasting glucose  -     Hemoglobin A1c; Future    Localized osteoporosis without current pathological fracture  -     Vitamin D 25 Hydroxy; Future    Iron deficiency anemia secondary to inadequate dietary iron intake  -     Vitamin B12; Future  -     Ferritin; Future  -     Iron Profile; Future    Other orders  -     ferrous sulfate 325 (65 FE) MG tablet; Take 1 tablet by mouth 1 (One) Time Per Week.        Lab on 09/05/2018   Component Date Value Ref Range Status   • Vitamin B-12 09/05/2018 805  239 - 931 pg/mL Final   • WBC 09/05/2018 3.76  3.20 - 9.80 10*3/mm3 Final   • RBC 09/05/2018 3.71* 3.77 - 5.16 10*6/mm3 Final   • Hemoglobin 09/05/2018 11.8* 12.0 - 15.5 g/dL Final   • Hematocrit 09/05/2018 34.2* 35.0 - 45.0 % Final   • MCV 09/05/2018 92.2  80.0 - 98.0 fL Final   • MCH 09/05/2018 31.8  26.5 - 34.0 pg Final   • MCHC 09/05/2018 34.5  31.4 - 36.0 g/dL Final   • RDW 09/05/2018 12.1  11.5 - 14.5 % Final   • RDW-SD 09/05/2018 39.5  36.4 - 46.3 fl Final   • MPV 09/05/2018 8.7  8.0 - 12.0 fL Final   • Platelets 09/05/2018 155  150 - 450 10*3/mm3 Final   • Neutrophil % 09/05/2018 48.0  37.0 - 80.0 % Final   • Lymphocyte % 09/05/2018 38.6  10.0 - 50.0 % Final   • Monocyte % 09/05/2018 10.4  0.0 - 12.0 % Final   • Eosinophil % 09/05/2018 2.7  0.0 - 7.0 % Final   • Basophil % 09/05/2018 0.3  0.0 - 2.0 % Final   • Neutrophils, Absolute 09/05/2018 1.81* 2.00 - 8.60 10*3/mm3 Final   • Lymphocytes, Absolute 09/05/2018 1.45  0.60 - 4.20 10*3/mm3 Final   • Monocytes, Absolute 09/05/2018 0.39  0.00 - 0.90 10*3/mm3 Final   • Eosinophils, Absolute 09/05/2018 0.10  0.00 - 0.70 10*3/mm3 Final   • Basophils, Absolute 09/05/2018 0.01  0.00 - 0.20 10*3/mm3 Final   • Ferritin 09/05/2018 172.00  11.10 - 264.00 ng/mL Final   • Hemoglobin A1C 09/05/2018 5.6  4 - 5.6 %  Final   • 25 Hydroxy, Vitamin D 09/05/2018 66.9  30.0 - 100.0 ng/ml Final   • TSH 09/05/2018 4.480  0.460 - 4.680 mIU/mL Final   • Glucose 09/05/2018 102* 60 - 100 mg/dL Final   • BUN 09/05/2018 11  7 - 21 mg/dL Final   • Creatinine 09/05/2018 0.91  0.50 - 1.00 mg/dL Final   • Sodium 09/05/2018 131* 137 - 145 mmol/L Final   • Potassium 09/05/2018 4.2  3.5 - 5.1 mmol/L Final   • Chloride 09/05/2018 97  95 - 110 mmol/L Final   • CO2 09/05/2018 24.0  22.0 - 31.0 mmol/L Final   • Calcium 09/05/2018 9.5  8.4 - 10.2 mg/dL Final   • Total Protein 09/05/2018 7.4  6.3 - 8.6 g/dL Final   • Albumin 09/05/2018 4.30  3.40 - 4.80 g/dL Final   • ALT (SGPT) 09/05/2018 37  9 - 52 U/L Final   • AST (SGOT) 09/05/2018 55* 14 - 36 U/L Final   • Alkaline Phosphatase 09/05/2018 65  38 - 126 U/L Final   • Total Bilirubin 09/05/2018 0.8  0.2 - 1.3 mg/dL Final   • eGFR Non  Amer 09/05/2018 59  39 - 90 mL/min/1.73 Final   • Globulin 09/05/2018 3.1  2.3 - 3.5 gm/dL Final   • A/G Ratio 09/05/2018 1.4  1.1 - 1.8 g/dL Final   • BUN/Creatinine Ratio 09/05/2018 12.1  7.0 - 25.0 Final   • Anion Gap 09/05/2018 10.0  5.0 - 15.0 mmol/L Final   • Total Cholesterol 09/05/2018 134  0 - 199 mg/dL Final   • Triglycerides 09/05/2018 120  20 - 199 mg/dL Final   • HDL Cholesterol 09/05/2018 48* 60 - 200 mg/dL Final   • LDL Cholesterol  09/05/2018 51  1 - 129 mg/dL Final   • LDL/HDL Ratio 09/05/2018 1.29  0.00 - 3.22 Final   ]

## 2018-10-01 RX ORDER — TRAMADOL HYDROCHLORIDE 50 MG/1
50-100 TABLET ORAL 3 TIMES DAILY PRN
Qty: 180 TABLET | Refills: 0 | Status: SHIPPED | OUTPATIENT
Start: 2018-10-01 | End: 2019-03-29 | Stop reason: SDUPTHER

## 2018-12-03 RX ORDER — ATORVASTATIN CALCIUM 10 MG/1
TABLET, FILM COATED ORAL
Qty: 90 TABLET | Refills: 3 | Status: SHIPPED | OUTPATIENT
Start: 2018-12-03 | End: 2019-12-06 | Stop reason: SDUPTHER

## 2018-12-03 RX ORDER — FERROUS SULFATE 325(65) MG
TABLET ORAL
Qty: 45 TABLET | Refills: 3 | OUTPATIENT
Start: 2018-12-03

## 2018-12-03 RX ORDER — ATENOLOL 50 MG/1
TABLET ORAL
Qty: 90 TABLET | Refills: 3 | Status: SHIPPED | OUTPATIENT
Start: 2018-12-03 | End: 2019-12-06 | Stop reason: SDUPTHER

## 2018-12-11 RX ORDER — POTASSIUM CHLORIDE 750 MG/1
10 TABLET, FILM COATED, EXTENDED RELEASE ORAL DAILY
Qty: 90 TABLET | Refills: 3 | Status: SHIPPED | OUTPATIENT
Start: 2018-12-11 | End: 2019-12-06 | Stop reason: SDUPTHER

## 2018-12-18 RX ORDER — ALPRAZOLAM 0.5 MG/1
TABLET ORAL
Qty: 30 TABLET | Refills: 0 | Status: SHIPPED | OUTPATIENT
Start: 2018-12-18 | End: 2019-01-15 | Stop reason: SDUPTHER

## 2018-12-18 NOTE — TELEPHONE ENCOUNTER
Patient has upcoming appt on 1-15-19. Needing enough to last until next visit.    ----- Message from Joy Blackman sent at 12/18/2018  9:19 AM CST -----  Xanax refill #30

## 2019-01-15 ENCOUNTER — OFFICE VISIT (OUTPATIENT)
Dept: FAMILY MEDICINE CLINIC | Facility: CLINIC | Age: 84
End: 2019-01-15

## 2019-01-15 VITALS
DIASTOLIC BLOOD PRESSURE: 70 MMHG | TEMPERATURE: 97.9 F | SYSTOLIC BLOOD PRESSURE: 138 MMHG | BODY MASS INDEX: 24.11 KG/M2 | HEIGHT: 59 IN | HEART RATE: 68 BPM | WEIGHT: 119.6 LBS

## 2019-01-15 DIAGNOSIS — K29.30 CHRONIC SUPERFICIAL GASTRITIS WITHOUT BLEEDING: Primary | Chronic | ICD-10-CM

## 2019-01-15 DIAGNOSIS — I10 ESSENTIAL HYPERTENSION: Chronic | ICD-10-CM

## 2019-01-15 DIAGNOSIS — G89.29 CHRONIC BILATERAL LOW BACK PAIN WITHOUT SCIATICA: Chronic | ICD-10-CM

## 2019-01-15 DIAGNOSIS — M54.50 CHRONIC BILATERAL LOW BACK PAIN WITHOUT SCIATICA: Chronic | ICD-10-CM

## 2019-01-15 DIAGNOSIS — G25.0 BENIGN ESSENTIAL TREMOR: Chronic | ICD-10-CM

## 2019-01-15 DIAGNOSIS — K58.1 IRRITABLE BOWEL SYNDROME WITH CONSTIPATION: Chronic | ICD-10-CM

## 2019-01-15 PROBLEM — D51.8 VITAMIN B12 DEFICIENCY (DIETARY) ANEMIA: Chronic | Status: ACTIVE | Noted: 2019-01-15

## 2019-01-15 PROCEDURE — 99214 OFFICE O/P EST MOD 30 MIN: CPT | Performed by: INTERNAL MEDICINE

## 2019-01-15 RX ORDER — ALPRAZOLAM 0.5 MG/1
TABLET ORAL
Qty: 60 TABLET | Refills: 2 | Status: SHIPPED | OUTPATIENT
Start: 2019-01-15 | End: 2019-03-29 | Stop reason: SDUPTHER

## 2019-01-15 RX ORDER — OMEPRAZOLE 40 MG/1
CAPSULE, DELAYED RELEASE ORAL
Qty: 90 CAPSULE | Refills: 3 | Status: SHIPPED | OUTPATIENT
Start: 2019-01-15 | End: 2020-03-10

## 2019-01-15 NOTE — PROGRESS NOTES
Subjective          History of Present Illness     Brittney Rollins is a 84 y.o. female who comes in for re-evaluation of REY. She continues to take Xanax chronically for anxiety/sleep.  She denies significant side effects including excessive daytime drowsiness or hallucinations.  Patient understands the risks associated with this controlled medication, including tolerance and addiction.  She also struggles with chronic back pain.     She continues on Prilosec to help manage GERD and is requesting a refill of the medication.      She reports intermittent episodes of tingling in left arm, which resolves after approximately an hour.  She also reports a worsening tremor of bilateral hands, left hand worse than right.  Denies dropping objects.    Denies family history of Parkinson's.  Neuro exam reveals no evidence of cogwheeling rigidity.  She will continue to monitor.  If tremor worsens, we can refer to neurology.  She will be back in March and we can re-evaluate at that time.        Weight is down 4 pounds in the past four months.  Blood pressure is at goal.      Review of Systems   Constitutional: Negative for chills, fatigue and fever.   HENT: Negative for congestion, ear pain, postnasal drip, sinus pressure and sore throat.    Respiratory: Negative for cough, shortness of breath and wheezing.    Cardiovascular: Negative for chest pain, palpitations and leg swelling.   Gastrointestinal: Negative for abdominal pain, blood in stool, constipation, diarrhea, nausea and vomiting.   Endocrine: Negative for cold intolerance, heat intolerance, polydipsia and polyuria.   Genitourinary: Negative for dysuria, frequency, hematuria and urgency.   Skin: Negative for rash.   Neurological: Negative for syncope and weakness.   Psychiatric/Behavioral: The patient is nervous/anxious.         Objective     Vitals:    01/15/19 1322   BP: 138/70   Pulse: 68   Temp: 97.9 °F (36.6 °C)   TempSrc: Oral   Weight: 54.3 kg (119 lb 9.6 oz)  "  Height: 149.9 cm (59\")     Physical Exam   Constitutional: She is oriented to person, place, and time. She appears well-developed and well-nourished. No distress.   HENT:   Head: Normocephalic and atraumatic.   Nose: Nose normal.   Mouth/Throat: Oropharynx is clear and moist. No oropharyngeal exudate.   Eyes: EOM are normal. Pupils are equal, round, and reactive to light.   Neck: Neck supple. No JVD present. No thyromegaly present.   Cardiovascular: Normal rate, regular rhythm and normal heart sounds.   Pulmonary/Chest: Effort normal and breath sounds normal. No accessory muscle usage. No respiratory distress. She has no wheezes. She has no rales.   Abdominal: Soft. Bowel sounds are normal. She exhibits no distension. There is no tenderness.   Musculoskeletal: She exhibits no edema.   Lymphadenopathy:     She has no cervical adenopathy.   Neurological: She is alert and oriented to person, place, and time. No cranial nerve deficit.   Neuro exam reveals tremor bilateral hands, left greater than right.  No evidence of cogwheeling rigidity on exam.    Psychiatric: She has a normal mood and affect. Her speech is normal and behavior is normal. Judgment and thought content normal.     Future Appointments   Date Time Provider Department Center   3/13/2019  1:30 PM Pollo Dillon MD [facility] None       Assessment/Plan      Refill is sent for Xanax 0.5 mg to take 1/2 to 1 tab bid.   Continue Tramadol/ Tylenol combo for chronic back pain. Patient understands the risks associated with this controlled medication, including tolerance and addiction.  She also agrees to only obtain this medication from me, and not from a another provider, unless that provider is covering for me in my absence. She also agrees to be compliant in dosing, and not self adjust the dose of medication.  A signed controlled substance agreement is on file, and she has received a controlled substance education sheet at this a previous visit.  She has also " signed a consent for treatment with a controlled substance as per [facility] policy. CONCHA was obtained.    Refill is sent for Prilosec 40 mg to take one daily for GERD symptoms.       She is given assurance the tremor she is experiencing is consistent with benign essential tremor.  If tremor worsens, we can refer to neurology.      Return in March 2019 for routine follow up with fasting labs one week prior.     Scribed for Dr. Dillon by Sophie Méndez [facility].     Diagnoses and all orders for this visit:    Chronic superficial gastritis without bleeding    Chronic bilateral low back pain without sciatica    Essential hypertension    Irritable bowel syndrome with constipation    Benign essential tremor    Other orders  -     ALPRAZolam (XANAX) 0.5 MG tablet; 1/2 to 1 tab bid  -     omeprazole (priLOSEC) 40 MG capsule; Take one capsule every morning        No visits with results within 3 Week(s) from this visit.   Latest known visit with results is:   Lab on 09/05/2018   Component Date Value Ref Range Status   • Vitamin B-12 09/05/2018 805  239 - 931 pg/mL Final   • WBC 09/05/2018 3.76  3.20 - 9.80 10*3/mm3 Final   • RBC 09/05/2018 3.71* 3.77 - 5.16 10*6/mm3 Final   • Hemoglobin 09/05/2018 11.8* 12.0 - 15.5 g/dL Final   • Hematocrit 09/05/2018 34.2* 35.0 - 45.0 % Final   • MCV 09/05/2018 92.2  80.0 - 98.0 fL Final   • MCH 09/05/2018 31.8  26.5 - 34.0 pg Final   • MCHC 09/05/2018 34.5  31.4 - 36.0 g/dL Final   • RDW 09/05/2018 12.1  11.5 - 14.5 % Final   • RDW-SD 09/05/2018 39.5  36.4 - 46.3 fl Final   • MPV 09/05/2018 8.7  8.0 - 12.0 fL Final   • Platelets 09/05/2018 155  150 - 450 10*3/mm3 Final   • Neutrophil % 09/05/2018 48.0  37.0 - 80.0 % Final   • Lymphocyte % 09/05/2018 38.6  10.0 - 50.0 % Final   • Monocyte % 09/05/2018 10.4  0.0 - 12.0 % Final   • Eosinophil % 09/05/2018 2.7  0.0 - 7.0 % Final   • Basophil % 09/05/2018 0.3  0.0 - 2.0 % Final   • Neutrophils, Absolute 09/05/2018 1.81* 2.00 - 8.60  10*3/mm3 Final   • Lymphocytes, Absolute 09/05/2018 1.45  0.60 - 4.20 10*3/mm3 Final   • Monocytes, Absolute 09/05/2018 0.39  0.00 - 0.90 10*3/mm3 Final   • Eosinophils, Absolute 09/05/2018 0.10  0.00 - 0.70 10*3/mm3 Final   • Basophils, Absolute 09/05/2018 0.01  0.00 - 0.20 10*3/mm3 Final   • Ferritin 09/05/2018 172.00  11.10 - 264.00 ng/mL Final   • Hemoglobin A1C 09/05/2018 5.6  4 - 5.6 % Final   • 25 Hydroxy, Vitamin D 09/05/2018 66.9  30.0 - 100.0 ng/ml Final   • TSH 09/05/2018 4.480  0.460 - 4.680 mIU/mL Final   • Glucose 09/05/2018 102* 60 - 100 mg/dL Final   • BUN 09/05/2018 11  7 - 21 mg/dL Final   • Creatinine 09/05/2018 0.91  0.50 - 1.00 mg/dL Final   • Sodium 09/05/2018 131* 137 - 145 mmol/L Final   • Potassium 09/05/2018 4.2  3.5 - 5.1 mmol/L Final   • Chloride 09/05/2018 97  95 - 110 mmol/L Final   • CO2 09/05/2018 24.0  22.0 - 31.0 mmol/L Final   • Calcium 09/05/2018 9.5  8.4 - 10.2 mg/dL Final   • Total Protein 09/05/2018 7.4  6.3 - 8.6 g/dL Final   • Albumin 09/05/2018 4.30  3.40 - 4.80 g/dL Final   • ALT (SGPT) 09/05/2018 37  9 - 52 U/L Final   • AST (SGOT) 09/05/2018 55* 14 - 36 U/L Final   • Alkaline Phosphatase 09/05/2018 65  38 - 126 U/L Final   • Total Bilirubin 09/05/2018 0.8  0.2 - 1.3 mg/dL Final   • eGFR Non  Amer 09/05/2018 59  39 - 90 mL/min/1.73 Final   • Globulin 09/05/2018 3.1  2.3 - 3.5 gm/dL Final   • A/G Ratio 09/05/2018 1.4  1.1 - 1.8 g/dL Final   • BUN/Creatinine Ratio 09/05/2018 12.1  7.0 - 25.0 Final   • Anion Gap 09/05/2018 10.0  5.0 - 15.0 mmol/L Final   • Total Cholesterol 09/05/2018 134  0 - 199 mg/dL Final   • Triglycerides 09/05/2018 120  20 - 199 mg/dL Final   • HDL Cholesterol 09/05/2018 48* 60 - 200 mg/dL Final   • LDL Cholesterol  09/05/2018 51  1 - 129 mg/dL Final   • LDL/HDL Ratio 09/05/2018 1.29  0.00 - 3.22 Final   ]

## 2019-03-04 RX ORDER — FERROUS SULFATE 325(65) MG
TABLET ORAL
Qty: 45 TABLET | Refills: 3 | OUTPATIENT
Start: 2019-03-04

## 2019-03-22 ENCOUNTER — LAB (OUTPATIENT)
Dept: LAB | Facility: OTHER | Age: 84
End: 2019-03-22

## 2019-03-22 DIAGNOSIS — R73.01 IMPAIRED FASTING GLUCOSE: Chronic | ICD-10-CM

## 2019-03-22 DIAGNOSIS — D50.8 IRON DEFICIENCY ANEMIA SECONDARY TO INADEQUATE DIETARY IRON INTAKE: Chronic | ICD-10-CM

## 2019-03-22 DIAGNOSIS — I10 ESSENTIAL HYPERTENSION: Chronic | ICD-10-CM

## 2019-03-22 DIAGNOSIS — E78.2 MIXED HYPERLIPIDEMIA: Chronic | ICD-10-CM

## 2019-03-22 DIAGNOSIS — M81.6 LOCALIZED OSTEOPOROSIS WITHOUT CURRENT PATHOLOGICAL FRACTURE: Chronic | ICD-10-CM

## 2019-03-22 LAB
25(OH)D3 SERPL-MCNC: 44.8 NG/ML (ref 30–100)
ALBUMIN SERPL-MCNC: 4.7 G/DL (ref 3.5–5)
ALBUMIN/GLOB SERPL: 1.6 G/DL (ref 1.1–1.8)
ALP SERPL-CCNC: 72 U/L (ref 38–126)
ALT SERPL W P-5'-P-CCNC: 23 U/L
ANION GAP SERPL CALCULATED.3IONS-SCNC: 8 MMOL/L (ref 5–15)
ARTICHOKE IGE QN: 64 MG/DL (ref 1–129)
AST SERPL-CCNC: 37 U/L (ref 14–36)
BASOPHILS # BLD AUTO: 0.01 10*3/MM3 (ref 0–0.2)
BASOPHILS NFR BLD AUTO: 0.2 % (ref 0–2)
BILIRUB SERPL-MCNC: 1.4 MG/DL (ref 0.2–1.3)
BUN BLD-MCNC: 8 MG/DL (ref 7–17)
BUN/CREAT SERPL: 9.1 (ref 7–25)
CALCIUM SPEC-SCNC: 9.5 MG/DL (ref 8.4–10.2)
CHLORIDE SERPL-SCNC: 98 MMOL/L (ref 98–107)
CO2 SERPL-SCNC: 28 MMOL/L (ref 22–30)
CREAT BLD-MCNC: 0.88 MG/DL (ref 0.52–1.04)
DEPRECATED RDW RBC AUTO: 41.3 FL (ref 36.4–46.3)
EOSINOPHIL # BLD AUTO: 0.1 10*3/MM3 (ref 0–0.7)
EOSINOPHIL NFR BLD AUTO: 2.2 % (ref 0–7)
ERYTHROCYTE [DISTWIDTH] IN BLOOD BY AUTOMATED COUNT: 12.7 % (ref 11.5–14.5)
FERRITIN SERPL-MCNC: 188 NG/ML (ref 11.1–264)
GFR SERPL CREATININE-BSD FRML MDRD: 61 ML/MIN/1.73 (ref 39–90)
GLOBULIN UR ELPH-MCNC: 2.9 GM/DL (ref 2.3–3.5)
GLUCOSE BLD-MCNC: 92 MG/DL (ref 74–99)
HBA1C MFR BLD: 5.6 % (ref 4–5.6)
HCT VFR BLD AUTO: 35.8 % (ref 35–45)
HGB BLD-MCNC: 12.1 G/DL (ref 12–15.5)
IRON 24H UR-MRATE: 110 MCG/DL (ref 37–170)
IRON SATN MFR SERPL: 45 % (ref 15–50)
LYMPHOCYTES # BLD AUTO: 1.69 10*3/MM3 (ref 0.6–4.2)
LYMPHOCYTES NFR BLD AUTO: 36.4 % (ref 10–50)
MCH RBC QN AUTO: 30.7 PG (ref 26.5–34)
MCHC RBC AUTO-ENTMCNC: 33.8 G/DL (ref 31.4–36)
MCV RBC AUTO: 90.9 FL (ref 80–98)
MONOCYTES # BLD AUTO: 0.52 10*3/MM3 (ref 0–0.9)
MONOCYTES NFR BLD AUTO: 11.2 % (ref 0–12)
NEUTROPHILS # BLD AUTO: 2.32 10*3/MM3 (ref 2–8.6)
NEUTROPHILS NFR BLD AUTO: 50 % (ref 37–80)
PLATELET # BLD AUTO: 169 10*3/MM3 (ref 150–450)
PMV BLD AUTO: 9 FL (ref 8–12)
POTASSIUM BLD-SCNC: 4.1 MMOL/L (ref 3.4–5)
PROT SERPL-MCNC: 7.6 G/DL (ref 6.3–8.2)
RBC # BLD AUTO: 3.94 10*6/MM3 (ref 3.77–5.16)
SODIUM BLD-SCNC: 134 MMOL/L (ref 137–145)
TIBC SERPL-MCNC: 247 MCG/DL (ref 265–497)
VIT B12 BLD-MCNC: 641 PG/ML (ref 239–931)
WBC NRBC COR # BLD: 4.64 10*3/MM3 (ref 3.2–9.8)

## 2019-03-22 PROCEDURE — 83721 ASSAY OF BLOOD LIPOPROTEIN: CPT | Performed by: INTERNAL MEDICINE

## 2019-03-22 PROCEDURE — 82607 VITAMIN B-12: CPT | Performed by: INTERNAL MEDICINE

## 2019-03-22 PROCEDURE — 83540 ASSAY OF IRON: CPT | Performed by: INTERNAL MEDICINE

## 2019-03-22 PROCEDURE — 82306 VITAMIN D 25 HYDROXY: CPT | Performed by: INTERNAL MEDICINE

## 2019-03-22 PROCEDURE — 83036 HEMOGLOBIN GLYCOSYLATED A1C: CPT | Performed by: INTERNAL MEDICINE

## 2019-03-22 PROCEDURE — 82728 ASSAY OF FERRITIN: CPT | Performed by: INTERNAL MEDICINE

## 2019-03-22 PROCEDURE — 85025 COMPLETE CBC W/AUTO DIFF WBC: CPT | Performed by: INTERNAL MEDICINE

## 2019-03-22 PROCEDURE — 80053 COMPREHEN METABOLIC PANEL: CPT | Performed by: INTERNAL MEDICINE

## 2019-03-22 PROCEDURE — 36415 COLL VENOUS BLD VENIPUNCTURE: CPT | Performed by: INTERNAL MEDICINE

## 2019-03-22 PROCEDURE — 83550 IRON BINDING TEST: CPT | Performed by: INTERNAL MEDICINE

## 2019-03-29 ENCOUNTER — OFFICE VISIT (OUTPATIENT)
Dept: FAMILY MEDICINE CLINIC | Facility: CLINIC | Age: 84
End: 2019-03-29

## 2019-03-29 VITALS
SYSTOLIC BLOOD PRESSURE: 146 MMHG | HEIGHT: 59 IN | DIASTOLIC BLOOD PRESSURE: 66 MMHG | BODY MASS INDEX: 24.31 KG/M2 | HEART RATE: 56 BPM | WEIGHT: 120.6 LBS | TEMPERATURE: 97.8 F

## 2019-03-29 DIAGNOSIS — G47.9 SLEEP DISORDER: Chronic | ICD-10-CM

## 2019-03-29 DIAGNOSIS — D50.8 IRON DEFICIENCY ANEMIA SECONDARY TO INADEQUATE DIETARY IRON INTAKE: Chronic | ICD-10-CM

## 2019-03-29 DIAGNOSIS — F34.1 DYSTHYMIA: Chronic | ICD-10-CM

## 2019-03-29 DIAGNOSIS — F41.1 GENERALIZED ANXIETY DISORDER: Chronic | ICD-10-CM

## 2019-03-29 DIAGNOSIS — K29.30 CHRONIC SUPERFICIAL GASTRITIS WITHOUT BLEEDING: Chronic | ICD-10-CM

## 2019-03-29 DIAGNOSIS — Z12.31 BREAST CANCER SCREENING BY MAMMOGRAM: ICD-10-CM

## 2019-03-29 DIAGNOSIS — I10 ESSENTIAL HYPERTENSION: Primary | Chronic | ICD-10-CM

## 2019-03-29 DIAGNOSIS — G89.29 CHRONIC BILATERAL LOW BACK PAIN WITHOUT SCIATICA: Chronic | ICD-10-CM

## 2019-03-29 DIAGNOSIS — R73.01 IMPAIRED FASTING GLUCOSE: Chronic | ICD-10-CM

## 2019-03-29 DIAGNOSIS — E78.2 MIXED HYPERLIPIDEMIA: Chronic | ICD-10-CM

## 2019-03-29 DIAGNOSIS — M54.50 CHRONIC BILATERAL LOW BACK PAIN WITHOUT SCIATICA: Chronic | ICD-10-CM

## 2019-03-29 DIAGNOSIS — D51.8 VITAMIN B12 DEFICIENCY (DIETARY) ANEMIA: Chronic | ICD-10-CM

## 2019-03-29 DIAGNOSIS — M81.6 LOCALIZED OSTEOPOROSIS WITHOUT CURRENT PATHOLOGICAL FRACTURE: Chronic | ICD-10-CM

## 2019-03-29 PROCEDURE — 99214 OFFICE O/P EST MOD 30 MIN: CPT | Performed by: INTERNAL MEDICINE

## 2019-03-29 PROCEDURE — 90732 PPSV23 VACC 2 YRS+ SUBQ/IM: CPT | Performed by: INTERNAL MEDICINE

## 2019-03-29 PROCEDURE — G0009 ADMIN PNEUMOCOCCAL VACCINE: HCPCS | Performed by: INTERNAL MEDICINE

## 2019-03-29 RX ORDER — ESCITALOPRAM OXALATE 10 MG/1
10 TABLET ORAL DAILY
Qty: 30 TABLET | Refills: 11 | Status: SHIPPED | OUTPATIENT
Start: 2019-03-29 | End: 2019-04-29

## 2019-03-29 RX ORDER — TRAMADOL HYDROCHLORIDE 50 MG/1
50-100 TABLET ORAL 3 TIMES DAILY PRN
Qty: 180 TABLET | Refills: 2 | Status: SHIPPED | OUTPATIENT
Start: 2019-03-29 | End: 2019-12-30 | Stop reason: SDUPTHER

## 2019-03-29 RX ORDER — ALPRAZOLAM 0.5 MG/1
TABLET ORAL
Qty: 60 TABLET | Refills: 2 | Status: SHIPPED | OUTPATIENT
Start: 2019-03-29 | End: 2019-12-30 | Stop reason: SDUPTHER

## 2019-03-29 NOTE — PROGRESS NOTES
Subjective        History of Present Illness     Brittney Rollins is a 84 y.o. female who presents for  six-month follow up on hyperlipidemia, hypertension, GERD, osteoporosis, REY, and hypokalemia among other issues.   GERD symptoms managed with Prilosec.     She feels a little more depressed recently and more tearful.  She continues to take Xanax chronically for anxiety/sleep.  She takes 1/2 tablet of Xanax at night when she is unable to sleep, although, she limits this due to some excessive daytime drowsiness.  Denies  or hallucinations.  Patient understands the risks associated with this controlled medication, including tolerance and addiction.  We discussed adding antidepressant therapy for the increased depressive symptoms.  She would like to try this.  She may require less Xanax if the Lexapro is effective for her.       She also struggles with chronic back and joint pain, for which she takes Tramadol, although, back pain has been a little worse recently.  Constipation is adequately managed with use of stool softeners.  I recommended she try topical BioFreeze.      DEXA 08/2017 revealed severe osteopenia of lumbar spine.  She has been intolerant to the Fosamax and Prolia in the past.  She has decided to continue on calcium and vitamin D supplements without prescription osteoporosis medication.  We will repeat DEXA this summer and review options pending results.     She is due Pneumovax. She had Prevnar 03/2018.    She continues on oral iron q.o.d.  She ran out of her prescription iron, although, she has been taking OTC iron.     Weight is down 3 pounds in the past six months.  She remains active for her age and continues to walk for exercise.  Denies chest pain with exercise.  Blood pressure at goal.      The patient's relevant past medical, surgical, and social history was reviewed in Epic.   Lab results are reviewed with the patient today.  CBC unremarkable. Fasting glucose 92. A1c is stable at 5.6.   "Vitamin D at goal.  Vitamin B-12 has drifted down, but remains at goal at 641. Normal renal and liver function normal.     Review of Systems   Constitutional: Negative for chills, fatigue and fever.   HENT: Negative for congestion, ear pain, postnasal drip, sinus pressure and sore throat.    Respiratory: Negative for cough, shortness of breath and wheezing.    Cardiovascular: Negative for chest pain, palpitations and leg swelling.   Gastrointestinal: Negative for abdominal pain, blood in stool, constipation, diarrhea, nausea and vomiting.   Endocrine: Negative for cold intolerance, heat intolerance, polydipsia and polyuria.   Genitourinary: Negative for dysuria, frequency, hematuria and urgency.   Skin: Negative for rash.   Neurological: Negative for syncope and weakness.        Objective      Vitals:    03/29/19 1447   BP: 146/66   Pulse: 56   Temp: 97.8 °F (36.6 °C)   TempSrc: Oral   Weight: 54.7 kg (120 lb 9.6 oz)   Height: 149.9 cm (59\")       Physical Exam   Constitutional: She is oriented to person, place, and time. She appears well-developed and well-nourished. No distress.   HENT:   Head: Normocephalic and atraumatic.   Nose: Right sinus exhibits no maxillary sinus tenderness and no frontal sinus tenderness. Left sinus exhibits no maxillary sinus tenderness and no frontal sinus tenderness.   Mouth/Throat: Uvula is midline, oropharynx is clear and moist and mucous membranes are normal. No oral lesions. No tonsillar exudate.   Eyes: Conjunctivae and EOM are normal. Pupils are equal, round, and reactive to light.   Neck: Trachea normal. Neck supple. No JVD present. Carotid bruit is not present. No tracheal deviation present. No thyroid mass and no thyromegaly present.   Cardiovascular: Normal rate, regular rhythm, normal heart sounds and intact distal pulses.  No extrasystoles are present. PMI is not displaced.   No murmur heard.  Pulmonary/Chest: Effort normal and breath sounds normal. No accessory muscle " usage. No respiratory distress. She has no decreased breath sounds. She has no wheezes. She has no rhonchi. She has no rales.   Abdominal: Soft. Bowel sounds are normal. She exhibits no distension. There is no hepatosplenomegaly. There is no tenderness.   Musculoskeletal:   Exam reveals arthritic coarseness and thickening of medial right knee.       Vascular Status -  Her right foot exhibits normal foot vasculature  and no edema. Her left foot exhibits normal foot vasculature  and no edema.  Lymphadenopathy:     She has no cervical adenopathy.   Neurological: She is alert and oriented to person, place, and time. No cranial nerve deficit. Coordination normal.   Skin: Skin is warm, dry and intact. No rash noted. No cyanosis. Nails show no clubbing.   Psychiatric: She has a normal mood and affect. Her speech is normal and behavior is normal. Judgment and thought content normal.   Vitals reviewed.        Assessment/Plan      Recommended use of OTC Bio Freeze to apply topically for the joint pain or back pain.  Patient is given a refill prescription for Tramadol 50 mg to take 1-2 tablets by mouth 3 (Three) Times a Day.  We continue to instruct her to take Tylenol 500 mg daily with each dose of tramadol, which she has been doing with good results.    For the dysthymia, we will start Lexapro 10 mg daily for depression taking 1/2 tablet daily for the first four days progressing to a whole tablet.  [facility]      Refill is given for Xanax 0.5 mg to take 1/2 to 1 tab bid.  Patient understands the risks associated with this controlled medication, including tolerance and addiction.  She also agrees to only obtain this medication from me, and not from a another provider, unless that provider is covering for me in my absence. She also agrees to be compliant in dosing, and not self adjust the dose of medication.  A signed controlled substance agreement is on file, and she has received a controlled  substance education sheet at this a previous visit.  She has also signed a consent for treatment with a controlled substance as per [facility] policy. CONCHA was obtained.     She is given Pneumovax today.  She had Prevnar 03/2018.     An order is placed for patient to schedule mammogram.  She will be due DEXA 09/2019.  Continue calcium and vitamin D supplements.  She was intolerant to the Fosamax and Prolia in the past.    Continue walking for exercise.  Written literature regarding exercise and staying healthy included with AVS today.  Fall precautions are emphasized.  She has been climbing up into a chair to get items out of her kitchen cabinets, which we greatly discouraged.    Continue other medications and vitamin/mineral supplements to treat the other medical issues we addressed today.    Return in six months for follow up with fasting labs one week prior.        Scribed for Dr. Dillon by Sophie Méndez [facility].     Diagnoses and all orders for this visit:    Essential hypertension  -     CBC Auto Differential; Future  -     Comprehensive Metabolic Panel; Future    Mixed hyperlipidemia  -     Lipid Panel; Future  -     TSH; Future    Chronic superficial gastritis without bleeding    Impaired fasting glucose    Chronic bilateral low back pain without sciatica    Localized osteoporosis without current pathological fracture  -     Vitamin D 25 Hydroxy; Future    Iron deficiency anemia secondary to inadequate dietary iron intake  -     Ferritin; Future  -     Iron Profile; Future    Vitamin B12 deficiency (dietary) anemia  -     Vitamin B12; Future    Generalized anxiety disorder    Sleep disorder    Dysthymia    Breast cancer screening by mammogram  -     Mammo Screening Digital Tomosynthesis Bilateral With CAD; Future    Other orders  -     traMADol (ULTRAM) 50 MG tablet; Take 1-2 tablets by mouth 3 (Three) Times a Day As Needed for Moderate Pain .  -     ALPRAZolam (XANAX) 0.5 MG tablet; 1/2 to 1 tab  bid  -     escitalopram (LEXAPRO) 10 MG tablet; Take 1 tablet by mouth Daily. For nerves and depression.  -     Pneumococcal Polysaccharide Vaccine 23-Valent Greater Than or Equal To 1yo Subcutaneous / IM        Lab on 03/22/2019   Component Date Value Ref Range Status   • WBC 03/22/2019 4.64  3.20 - 9.80 10*3/mm3 Final   • RBC 03/22/2019 3.94  3.77 - 5.16 10*6/mm3 Final   • Hemoglobin 03/22/2019 12.1  12.0 - 15.5 g/dL Final   • Hematocrit 03/22/2019 35.8  35.0 - 45.0 % Final   • MCV 03/22/2019 90.9  80.0 - 98.0 fL Final   • MCH 03/22/2019 30.7  26.5 - 34.0 pg Final   • MCHC 03/22/2019 33.8  31.4 - 36.0 g/dL Final   • RDW 03/22/2019 12.7  11.5 - 14.5 % Final   • RDW-SD 03/22/2019 41.3  36.4 - 46.3 fl Final   • MPV 03/22/2019 9.0  8.0 - 12.0 fL Final   • Platelets 03/22/2019 169  150 - 450 10*3/mm3 Final   • Neutrophil % 03/22/2019 50.0  37.0 - 80.0 % Final   • Lymphocyte % 03/22/2019 36.4  10.0 - 50.0 % Final   • Monocyte % 03/22/2019 11.2  0.0 - 12.0 % Final   • Eosinophil % 03/22/2019 2.2  0.0 - 7.0 % Final   • Basophil % 03/22/2019 0.2  0.0 - 2.0 % Final   • Neutrophils, Absolute 03/22/2019 2.32  2.00 - 8.60 10*3/mm3 Final   • Lymphocytes, Absolute 03/22/2019 1.69  0.60 - 4.20 10*3/mm3 Final   • Monocytes, Absolute 03/22/2019 0.52  0.00 - 0.90 10*3/mm3 Final   • Eosinophils, Absolute 03/22/2019 0.10  0.00 - 0.70 10*3/mm3 Final   • Basophils, Absolute 03/22/2019 0.01  0.00 - 0.20 10*3/mm3 Final   • Glucose 03/22/2019 92  74 - 99 mg/dL Final   • BUN 03/22/2019 8  7 - 17 mg/dL Final   • Creatinine 03/22/2019 0.88  0.52 - 1.04 mg/dL Final   • Sodium 03/22/2019 134* 137 - 145 mmol/L Final   • Potassium 03/22/2019 4.1  3.4 - 5.0 mmol/L Final   • Chloride 03/22/2019 98  98 - 107 mmol/L Final   • CO2 03/22/2019 28.0  22.0 - 30.0 mmol/L Final   • Calcium 03/22/2019 9.5  8.4 - 10.2 mg/dL Final   • Total Protein 03/22/2019 7.6  6.3 - 8.2 g/dL Final   • Albumin 03/22/2019 4.70  3.50 - 5.00 g/dL Final   • ALT (SGPT)  03/22/2019 23  <=35 U/L Final   • AST (SGOT) 03/22/2019 37* 14 - 36 U/L Final   • Alkaline Phosphatase 03/22/2019 72  38 - 126 U/L Final   • Total Bilirubin 03/22/2019 1.4* 0.2 - 1.3 mg/dL Final   • eGFR Non African Amer 03/22/2019 61  39 - 90 mL/min/1.73 Final   • Globulin 03/22/2019 2.9  2.3 - 3.5 gm/dL Final   • A/G Ratio 03/22/2019 1.6  1.1 - 1.8 g/dL Final   • BUN/Creatinine Ratio 03/22/2019 9.1  7.0 - 25.0 Final   • Anion Gap 03/22/2019 8.0  5.0 - 15.0 mmol/L Final   • Hemoglobin A1C 03/22/2019 5.6  4 - 5.6 % Final   • LDL Cholesterol  03/22/2019 64  1 - 129 mg/dL Final   • 25 Hydroxy, Vitamin D 03/22/2019 44.8  30.0 - 100.0 ng/ml Final   • Vitamin B-12 03/22/2019 641  239 - 931 pg/mL Final   • Ferritin 03/22/2019 188.00  11.10 - 264.00 ng/mL Final   • Iron 03/22/2019 110  37 - 170 mcg/dL Final   • TIBC 03/22/2019 247* 265 - 497 mcg/dL Final   • Iron Saturation 03/22/2019 45  15 - 50 % Final   ]

## 2019-03-29 NOTE — PATIENT INSTRUCTIONS

## 2019-04-19 RX ORDER — LISINOPRIL 10 MG/1
TABLET ORAL
Qty: 90 TABLET | Refills: 3 | Status: SHIPPED | OUTPATIENT
Start: 2019-04-19 | End: 2020-01-13

## 2019-08-30 RX ORDER — MECLIZINE HYDROCHLORIDE 25 MG/1
TABLET ORAL
Qty: 30 TABLET | Refills: 1 | Status: SHIPPED | OUTPATIENT
Start: 2019-08-30 | End: 2019-10-10 | Stop reason: SDUPTHER

## 2019-10-04 ENCOUNTER — LAB (OUTPATIENT)
Dept: LAB | Facility: OTHER | Age: 84
End: 2019-10-04

## 2019-10-04 DIAGNOSIS — M81.6 LOCALIZED OSTEOPOROSIS WITHOUT CURRENT PATHOLOGICAL FRACTURE: Chronic | ICD-10-CM

## 2019-10-04 DIAGNOSIS — E78.2 MIXED HYPERLIPIDEMIA: Chronic | ICD-10-CM

## 2019-10-04 DIAGNOSIS — I10 ESSENTIAL HYPERTENSION: Chronic | ICD-10-CM

## 2019-10-04 DIAGNOSIS — D51.8 VITAMIN B12 DEFICIENCY (DIETARY) ANEMIA: Chronic | ICD-10-CM

## 2019-10-04 DIAGNOSIS — D50.8 IRON DEFICIENCY ANEMIA SECONDARY TO INADEQUATE DIETARY IRON INTAKE: Chronic | ICD-10-CM

## 2019-10-04 LAB
ALBUMIN SERPL-MCNC: 4.7 G/DL (ref 3.5–5)
ALBUMIN/GLOB SERPL: 1.5 G/DL (ref 1.1–1.8)
ALP SERPL-CCNC: 73 U/L (ref 38–126)
ALT SERPL W P-5'-P-CCNC: 18 U/L
ANION GAP SERPL CALCULATED.3IONS-SCNC: 13 MMOL/L (ref 5–15)
AST SERPL-CCNC: 33 U/L (ref 14–36)
BASOPHILS # BLD AUTO: 0.01 10*3/MM3 (ref 0–0.2)
BASOPHILS NFR BLD AUTO: 0.3 % (ref 0–1.5)
BILIRUB SERPL-MCNC: 1.5 MG/DL (ref 0.2–1.3)
BUN BLD-MCNC: 5 MG/DL (ref 7–23)
BUN/CREAT SERPL: 5 (ref 7–25)
CALCIUM SPEC-SCNC: 9.8 MG/DL (ref 8.4–10.2)
CHLORIDE SERPL-SCNC: 105 MMOL/L (ref 101–112)
CHOLEST SERPL-MCNC: 146 MG/DL (ref 150–200)
CO2 SERPL-SCNC: 26 MMOL/L (ref 22–30)
CREAT BLD-MCNC: 1.01 MG/DL (ref 0.52–1.04)
DEPRECATED RDW RBC AUTO: 43.7 FL (ref 37–54)
EOSINOPHIL # BLD AUTO: 0.09 10*3/MM3 (ref 0–0.4)
EOSINOPHIL NFR BLD AUTO: 2.5 % (ref 0.3–6.2)
ERYTHROCYTE [DISTWIDTH] IN BLOOD BY AUTOMATED COUNT: 13.3 % (ref 12.3–15.4)
GFR SERPL CREATININE-BSD FRML MDRD: 52 ML/MIN/1.73 (ref 39–90)
GLOBULIN UR ELPH-MCNC: 3.2 GM/DL (ref 2.3–3.5)
GLUCOSE BLD-MCNC: 104 MG/DL (ref 70–99)
HCT VFR BLD AUTO: 38.4 % (ref 34–46.6)
HDLC SERPL-MCNC: 51 MG/DL (ref 40–59)
HGB BLD-MCNC: 13.2 G/DL (ref 12–15.9)
LDLC SERPL CALC-MCNC: 57 MG/DL
LDLC/HDLC SERPL: 1.13 {RATIO} (ref 0–3.22)
LYMPHOCYTES # BLD AUTO: 1.6 10*3/MM3 (ref 0.7–3.1)
LYMPHOCYTES NFR BLD AUTO: 44.8 % (ref 19.6–45.3)
MCH RBC QN AUTO: 32 PG (ref 26.6–33)
MCHC RBC AUTO-ENTMCNC: 34.4 G/DL (ref 31.5–35.7)
MCV RBC AUTO: 93.2 FL (ref 79–97)
MONOCYTES # BLD AUTO: 0.38 10*3/MM3 (ref 0.1–0.9)
MONOCYTES NFR BLD AUTO: 10.6 % (ref 5–12)
NEUTROPHILS # BLD AUTO: 1.49 10*3/MM3 (ref 1.7–7)
NEUTROPHILS NFR BLD AUTO: 41.8 % (ref 42.7–76)
PLATELET # BLD AUTO: 159 10*3/MM3 (ref 140–450)
PMV BLD AUTO: 10.3 FL (ref 6–12)
POTASSIUM BLD-SCNC: 4.1 MMOL/L (ref 3.4–5)
PROT SERPL-MCNC: 7.9 G/DL (ref 6.3–8.6)
RBC # BLD AUTO: 4.12 10*6/MM3 (ref 3.77–5.28)
SODIUM BLD-SCNC: 144 MMOL/L (ref 137–145)
TRIGL SERPL-MCNC: 188 MG/DL
VLDLC SERPL-MCNC: 37.6 MG/DL
WBC NRBC COR # BLD: 3.57 10*3/MM3 (ref 3.4–10.8)

## 2019-10-04 PROCEDURE — 80053 COMPREHEN METABOLIC PANEL: CPT | Performed by: INTERNAL MEDICINE

## 2019-10-04 PROCEDURE — 84443 ASSAY THYROID STIM HORMONE: CPT | Performed by: INTERNAL MEDICINE

## 2019-10-04 PROCEDURE — 80061 LIPID PANEL: CPT | Performed by: INTERNAL MEDICINE

## 2019-10-04 PROCEDURE — 82728 ASSAY OF FERRITIN: CPT | Performed by: INTERNAL MEDICINE

## 2019-10-04 PROCEDURE — 85025 COMPLETE CBC W/AUTO DIFF WBC: CPT | Performed by: INTERNAL MEDICINE

## 2019-10-04 PROCEDURE — 83540 ASSAY OF IRON: CPT | Performed by: INTERNAL MEDICINE

## 2019-10-04 PROCEDURE — 84466 ASSAY OF TRANSFERRIN: CPT | Performed by: INTERNAL MEDICINE

## 2019-10-04 PROCEDURE — 82607 VITAMIN B-12: CPT | Performed by: INTERNAL MEDICINE

## 2019-10-04 PROCEDURE — 82306 VITAMIN D 25 HYDROXY: CPT | Performed by: INTERNAL MEDICINE

## 2019-10-04 PROCEDURE — 36415 COLL VENOUS BLD VENIPUNCTURE: CPT | Performed by: INTERNAL MEDICINE

## 2019-10-05 LAB
25(OH)D3 SERPL-MCNC: 53.2 NG/ML (ref 30–100)
FERRITIN SERPL-MCNC: 334 NG/ML (ref 13–150)
IRON 24H UR-MRATE: 112 MCG/DL (ref 37–145)
IRON SATN MFR SERPL: 36 % (ref 20–50)
TIBC SERPL-MCNC: 308 MCG/DL (ref 298–536)
TRANSFERRIN SERPL-MCNC: 207 MG/DL (ref 200–360)
TSH SERPL DL<=0.05 MIU/L-ACNC: 3.8 UIU/ML (ref 0.27–4.2)
VIT B12 BLD-MCNC: 643 PG/ML (ref 211–946)

## 2019-10-10 ENCOUNTER — OFFICE VISIT (OUTPATIENT)
Dept: FAMILY MEDICINE CLINIC | Facility: CLINIC | Age: 84
End: 2019-10-10

## 2019-10-10 VITALS
BODY MASS INDEX: 23.99 KG/M2 | SYSTOLIC BLOOD PRESSURE: 118 MMHG | HEIGHT: 59 IN | TEMPERATURE: 98.7 F | WEIGHT: 119 LBS | HEART RATE: 74 BPM | DIASTOLIC BLOOD PRESSURE: 68 MMHG | OXYGEN SATURATION: 98 %

## 2019-10-10 DIAGNOSIS — M81.0 AGE-RELATED OSTEOPOROSIS WITHOUT CURRENT PATHOLOGICAL FRACTURE: Chronic | ICD-10-CM

## 2019-10-10 DIAGNOSIS — I10 ESSENTIAL HYPERTENSION: Chronic | ICD-10-CM

## 2019-10-10 DIAGNOSIS — G47.9 SLEEP DISORDER: Chronic | ICD-10-CM

## 2019-10-10 DIAGNOSIS — D51.8 VITAMIN B12 DEFICIENCY (DIETARY) ANEMIA: Chronic | ICD-10-CM

## 2019-10-10 DIAGNOSIS — M54.50 CHRONIC BILATERAL LOW BACK PAIN WITHOUT SCIATICA: Chronic | ICD-10-CM

## 2019-10-10 DIAGNOSIS — G89.29 CHRONIC BILATERAL LOW BACK PAIN WITHOUT SCIATICA: Chronic | ICD-10-CM

## 2019-10-10 DIAGNOSIS — D50.8 IRON DEFICIENCY ANEMIA SECONDARY TO INADEQUATE DIETARY IRON INTAKE: Chronic | ICD-10-CM

## 2019-10-10 DIAGNOSIS — F34.1 DYSTHYMIA: Chronic | ICD-10-CM

## 2019-10-10 DIAGNOSIS — Z12.31 ENCOUNTER FOR SCREENING MAMMOGRAM FOR BREAST CANCER: ICD-10-CM

## 2019-10-10 DIAGNOSIS — E78.2 MIXED HYPERLIPIDEMIA: Primary | Chronic | ICD-10-CM

## 2019-10-10 DIAGNOSIS — M85.88 OSTEOPENIA OF LUMBAR SPINE: Chronic | ICD-10-CM

## 2019-10-10 DIAGNOSIS — R73.01 IMPAIRED FASTING GLUCOSE: Chronic | ICD-10-CM

## 2019-10-10 DIAGNOSIS — I71.20 THORACIC AORTIC ANEURYSM WITHOUT RUPTURE (HCC): ICD-10-CM

## 2019-10-10 DIAGNOSIS — F41.1 GENERALIZED ANXIETY DISORDER: Chronic | ICD-10-CM

## 2019-10-10 DIAGNOSIS — K29.30 CHRONIC SUPERFICIAL GASTRITIS WITHOUT BLEEDING: Chronic | ICD-10-CM

## 2019-10-10 PROCEDURE — 99214 OFFICE O/P EST MOD 30 MIN: CPT | Performed by: INTERNAL MEDICINE

## 2019-10-10 PROCEDURE — 90653 IIV ADJUVANT VACCINE IM: CPT | Performed by: INTERNAL MEDICINE

## 2019-10-10 PROCEDURE — G0008 ADMIN INFLUENZA VIRUS VAC: HCPCS | Performed by: INTERNAL MEDICINE

## 2019-10-10 RX ORDER — MECLIZINE HYDROCHLORIDE 25 MG/1
25 TABLET ORAL 4 TIMES DAILY PRN
Qty: 30 TABLET | Refills: 1 | Status: SHIPPED | OUTPATIENT
Start: 2019-10-10 | End: 2020-03-10

## 2019-10-15 DIAGNOSIS — I71.20 THORACIC AORTIC ANEURYSM WITHOUT RUPTURE (HCC): Primary | Chronic | ICD-10-CM

## 2019-10-30 ENCOUNTER — LAB (OUTPATIENT)
Dept: LAB | Facility: OTHER | Age: 84
End: 2019-10-30

## 2019-10-30 DIAGNOSIS — I10 ESSENTIAL HYPERTENSION: Chronic | ICD-10-CM

## 2019-10-30 DIAGNOSIS — E78.2 MIXED HYPERLIPIDEMIA: Chronic | ICD-10-CM

## 2019-10-30 LAB
ALBUMIN SERPL-MCNC: 4.7 G/DL (ref 3.5–5)
ALBUMIN/GLOB SERPL: 1.5 G/DL (ref 1.1–1.8)
ALP SERPL-CCNC: 75 U/L (ref 38–126)
ALT SERPL W P-5'-P-CCNC: 20 U/L
ANION GAP SERPL CALCULATED.3IONS-SCNC: 10 MMOL/L (ref 5–15)
AST SERPL-CCNC: 39 U/L (ref 14–36)
BILIRUB SERPL-MCNC: 1.3 MG/DL (ref 0.2–1.3)
BUN BLD-MCNC: 12 MG/DL (ref 7–23)
BUN/CREAT SERPL: 13 (ref 7–25)
CALCIUM SPEC-SCNC: 10.1 MG/DL (ref 8.4–10.2)
CHLORIDE SERPL-SCNC: 100 MMOL/L (ref 101–112)
CO2 SERPL-SCNC: 28 MMOL/L (ref 22–30)
CREAT BLD-MCNC: 0.92 MG/DL (ref 0.52–1.04)
GFR SERPL CREATININE-BSD FRML MDRD: 58 ML/MIN/1.73 (ref 39–90)
GLOBULIN UR ELPH-MCNC: 3.2 GM/DL (ref 2.3–3.5)
GLUCOSE BLD-MCNC: 99 MG/DL (ref 70–99)
POTASSIUM BLD-SCNC: 4.1 MMOL/L (ref 3.4–5)
PROT SERPL-MCNC: 7.9 G/DL (ref 6.3–8.6)
SODIUM BLD-SCNC: 138 MMOL/L (ref 137–145)

## 2019-10-30 PROCEDURE — 36415 COLL VENOUS BLD VENIPUNCTURE: CPT | Performed by: INTERNAL MEDICINE

## 2019-10-30 PROCEDURE — 80053 COMPREHEN METABOLIC PANEL: CPT | Performed by: INTERNAL MEDICINE

## 2019-12-06 RX ORDER — POTASSIUM CHLORIDE 750 MG/1
10 TABLET, FILM COATED, EXTENDED RELEASE ORAL DAILY
Qty: 90 TABLET | Refills: 3 | Status: SHIPPED | OUTPATIENT
Start: 2019-12-06 | End: 2020-12-09

## 2019-12-06 RX ORDER — ATORVASTATIN CALCIUM 10 MG/1
TABLET, FILM COATED ORAL
Qty: 90 TABLET | Refills: 3 | Status: SHIPPED | OUTPATIENT
Start: 2019-12-06 | End: 2020-09-01

## 2019-12-06 RX ORDER — ATENOLOL 50 MG/1
TABLET ORAL
Qty: 90 TABLET | Refills: 3 | Status: SHIPPED | OUTPATIENT
Start: 2019-12-06 | End: 2020-09-01

## 2019-12-23 RX ORDER — TRAMADOL HYDROCHLORIDE 50 MG/1
TABLET ORAL
Qty: 180 TABLET | Refills: 1 | OUTPATIENT
Start: 2019-12-23

## 2019-12-30 DIAGNOSIS — M54.50 CHRONIC BILATERAL LOW BACK PAIN WITHOUT SCIATICA: Primary | ICD-10-CM

## 2019-12-30 DIAGNOSIS — F41.1 GENERALIZED ANXIETY DISORDER: ICD-10-CM

## 2019-12-30 DIAGNOSIS — G89.29 CHRONIC BILATERAL LOW BACK PAIN WITHOUT SCIATICA: Primary | ICD-10-CM

## 2019-12-30 RX ORDER — TRAMADOL HYDROCHLORIDE 50 MG/1
50-100 TABLET ORAL 3 TIMES DAILY PRN
Qty: 180 TABLET | Refills: 0 | Status: SHIPPED | OUTPATIENT
Start: 2019-12-30 | End: 2020-04-10 | Stop reason: SDUPTHER

## 2019-12-30 RX ORDER — ALPRAZOLAM 0.5 MG/1
TABLET ORAL
Qty: 60 TABLET | Refills: 0 | Status: SHIPPED | OUTPATIENT
Start: 2019-12-30 | End: 2020-04-10 | Stop reason: SDUPTHER

## 2020-01-13 RX ORDER — LISINOPRIL 10 MG/1
TABLET ORAL
Qty: 90 TABLET | Refills: 3 | Status: SHIPPED | OUTPATIENT
Start: 2020-01-13 | End: 2020-04-10 | Stop reason: SDUPTHER

## 2020-03-10 RX ORDER — OMEPRAZOLE 40 MG/1
CAPSULE, DELAYED RELEASE ORAL
Qty: 90 CAPSULE | Refills: 3 | Status: SHIPPED | OUTPATIENT
Start: 2020-03-10 | End: 2020-12-09

## 2020-03-10 RX ORDER — MECLIZINE HYDROCHLORIDE 25 MG/1
TABLET ORAL
Qty: 30 TABLET | Refills: 2 | Status: SHIPPED | OUTPATIENT
Start: 2020-03-10

## 2020-04-10 DIAGNOSIS — M54.50 CHRONIC BILATERAL LOW BACK PAIN WITHOUT SCIATICA: ICD-10-CM

## 2020-04-10 DIAGNOSIS — G89.29 CHRONIC BILATERAL LOW BACK PAIN WITHOUT SCIATICA: ICD-10-CM

## 2020-04-10 DIAGNOSIS — F41.1 GENERALIZED ANXIETY DISORDER: ICD-10-CM

## 2020-04-10 RX ORDER — LISINOPRIL 10 MG/1
10 TABLET ORAL DAILY
Qty: 90 TABLET | Refills: 0 | Status: SHIPPED | OUTPATIENT
Start: 2020-04-10 | End: 2021-04-06

## 2020-04-10 RX ORDER — TRAMADOL HYDROCHLORIDE 50 MG/1
50-100 TABLET ORAL 3 TIMES DAILY PRN
Qty: 180 TABLET | Refills: 0 | Status: SHIPPED | OUTPATIENT
Start: 2020-04-10 | End: 2020-07-29 | Stop reason: SDUPTHER

## 2020-04-10 RX ORDER — ALPRAZOLAM 0.5 MG/1
TABLET ORAL
Qty: 60 TABLET | Refills: 2 | Status: SHIPPED | OUTPATIENT
Start: 2020-04-10 | End: 2020-07-29 | Stop reason: SDUPTHER

## 2020-07-15 DIAGNOSIS — G89.29 CHRONIC BILATERAL LOW BACK PAIN WITHOUT SCIATICA: ICD-10-CM

## 2020-07-15 DIAGNOSIS — M54.50 CHRONIC BILATERAL LOW BACK PAIN WITHOUT SCIATICA: ICD-10-CM

## 2020-07-15 RX ORDER — TRAMADOL HYDROCHLORIDE 50 MG/1
TABLET ORAL
Qty: 180 TABLET | Refills: 0 | OUTPATIENT
Start: 2020-07-15

## 2020-07-20 DIAGNOSIS — G89.29 CHRONIC BILATERAL LOW BACK PAIN WITHOUT SCIATICA: ICD-10-CM

## 2020-07-20 DIAGNOSIS — M54.50 CHRONIC BILATERAL LOW BACK PAIN WITHOUT SCIATICA: ICD-10-CM

## 2020-07-20 RX ORDER — TRAMADOL HYDROCHLORIDE 50 MG/1
TABLET ORAL
Qty: 180 TABLET | Refills: 0 | OUTPATIENT
Start: 2020-07-20

## 2020-07-22 ENCOUNTER — LAB (OUTPATIENT)
Dept: LAB | Facility: OTHER | Age: 85
End: 2020-07-22

## 2020-07-22 DIAGNOSIS — D50.8 IRON DEFICIENCY ANEMIA SECONDARY TO INADEQUATE DIETARY IRON INTAKE: Chronic | ICD-10-CM

## 2020-07-22 DIAGNOSIS — M85.88 OSTEOPENIA OF LUMBAR SPINE: Chronic | ICD-10-CM

## 2020-07-22 DIAGNOSIS — M81.0 AGE-RELATED OSTEOPOROSIS WITHOUT CURRENT PATHOLOGICAL FRACTURE: Chronic | ICD-10-CM

## 2020-07-22 DIAGNOSIS — R73.01 IMPAIRED FASTING GLUCOSE: Chronic | ICD-10-CM

## 2020-07-22 DIAGNOSIS — E78.2 MIXED HYPERLIPIDEMIA: Chronic | ICD-10-CM

## 2020-07-22 DIAGNOSIS — D51.8 VITAMIN B12 DEFICIENCY (DIETARY) ANEMIA: Chronic | ICD-10-CM

## 2020-07-22 LAB
BASOPHILS # BLD AUTO: 0.01 10*3/MM3 (ref 0–0.2)
BASOPHILS NFR BLD AUTO: 0.3 % (ref 0–1.5)
DEPRECATED RDW RBC AUTO: 41.1 FL (ref 37–54)
EOSINOPHIL # BLD AUTO: 0.14 10*3/MM3 (ref 0–0.4)
EOSINOPHIL NFR BLD AUTO: 3.7 % (ref 0.3–6.2)
ERYTHROCYTE [DISTWIDTH] IN BLOOD BY AUTOMATED COUNT: 12.5 % (ref 12.3–15.4)
HCT VFR BLD AUTO: 37.9 % (ref 34–46.6)
HGB BLD-MCNC: 13.1 G/DL (ref 12–15.9)
LYMPHOCYTES # BLD AUTO: 1.6 10*3/MM3 (ref 0.7–3.1)
LYMPHOCYTES NFR BLD AUTO: 41.8 % (ref 19.6–45.3)
MCH RBC QN AUTO: 31.8 PG (ref 26.6–33)
MCHC RBC AUTO-ENTMCNC: 34.6 G/DL (ref 31.5–35.7)
MCV RBC AUTO: 92 FL (ref 79–97)
MONOCYTES # BLD AUTO: 0.43 10*3/MM3 (ref 0.1–0.9)
MONOCYTES NFR BLD AUTO: 11.2 % (ref 5–12)
NEUTROPHILS NFR BLD AUTO: 1.65 10*3/MM3 (ref 1.7–7)
NEUTROPHILS NFR BLD AUTO: 43 % (ref 42.7–76)
PLATELET # BLD AUTO: 177 10*3/MM3 (ref 140–450)
PMV BLD AUTO: 9.4 FL (ref 6–12)
RBC # BLD AUTO: 4.12 10*6/MM3 (ref 3.77–5.28)
WBC # BLD AUTO: 3.83 10*3/MM3 (ref 3.4–10.8)

## 2020-07-22 PROCEDURE — 83036 HEMOGLOBIN GLYCOSYLATED A1C: CPT | Performed by: INTERNAL MEDICINE

## 2020-07-22 PROCEDURE — 83540 ASSAY OF IRON: CPT | Performed by: INTERNAL MEDICINE

## 2020-07-22 PROCEDURE — 83721 ASSAY OF BLOOD LIPOPROTEIN: CPT | Performed by: INTERNAL MEDICINE

## 2020-07-22 PROCEDURE — 82607 VITAMIN B-12: CPT | Performed by: INTERNAL MEDICINE

## 2020-07-22 PROCEDURE — 84466 ASSAY OF TRANSFERRIN: CPT | Performed by: INTERNAL MEDICINE

## 2020-07-22 PROCEDURE — 82728 ASSAY OF FERRITIN: CPT | Performed by: INTERNAL MEDICINE

## 2020-07-22 PROCEDURE — 36415 COLL VENOUS BLD VENIPUNCTURE: CPT | Performed by: INTERNAL MEDICINE

## 2020-07-22 PROCEDURE — 82306 VITAMIN D 25 HYDROXY: CPT | Performed by: INTERNAL MEDICINE

## 2020-07-22 PROCEDURE — 85025 COMPLETE CBC W/AUTO DIFF WBC: CPT | Performed by: INTERNAL MEDICINE

## 2020-07-23 LAB
25(OH)D3 SERPL-MCNC: 49.9 NG/ML (ref 30–100)
ARTICHOKE IGE QN: 78 MG/DL (ref 0–100)
FERRITIN SERPL-MCNC: 479 NG/ML (ref 13–150)
HBA1C MFR BLD: 5.3 % (ref 4.8–5.6)
IRON 24H UR-MRATE: 107 MCG/DL (ref 37–145)
IRON SATN MFR SERPL: 35 % (ref 20–50)
TIBC SERPL-MCNC: 307 MCG/DL (ref 298–536)
TRANSFERRIN SERPL-MCNC: 206 MG/DL (ref 200–360)
VIT B12 BLD-MCNC: 705 PG/ML (ref 211–946)

## 2020-07-29 ENCOUNTER — OFFICE VISIT (OUTPATIENT)
Dept: FAMILY MEDICINE CLINIC | Facility: CLINIC | Age: 85
End: 2020-07-29

## 2020-07-29 VITALS
DIASTOLIC BLOOD PRESSURE: 73 MMHG | BODY MASS INDEX: 23.99 KG/M2 | HEIGHT: 59 IN | WEIGHT: 119 LBS | SYSTOLIC BLOOD PRESSURE: 137 MMHG | HEART RATE: 69 BPM

## 2020-07-29 DIAGNOSIS — E78.2 MIXED HYPERLIPIDEMIA: Chronic | ICD-10-CM

## 2020-07-29 DIAGNOSIS — F34.1 DYSTHYMIA: Chronic | ICD-10-CM

## 2020-07-29 DIAGNOSIS — G89.29 CHRONIC BILATERAL LOW BACK PAIN WITHOUT SCIATICA: ICD-10-CM

## 2020-07-29 DIAGNOSIS — M81.0 AGE-RELATED OSTEOPOROSIS WITHOUT CURRENT PATHOLOGICAL FRACTURE: Chronic | ICD-10-CM

## 2020-07-29 DIAGNOSIS — F41.1 GENERALIZED ANXIETY DISORDER: ICD-10-CM

## 2020-07-29 DIAGNOSIS — D51.8 VITAMIN B12 DEFICIENCY (DIETARY) ANEMIA: Chronic | ICD-10-CM

## 2020-07-29 DIAGNOSIS — M54.50 CHRONIC BILATERAL LOW BACK PAIN WITHOUT SCIATICA: ICD-10-CM

## 2020-07-29 DIAGNOSIS — R73.01 IMPAIRED FASTING GLUCOSE: Chronic | ICD-10-CM

## 2020-07-29 DIAGNOSIS — D50.8 IRON DEFICIENCY ANEMIA SECONDARY TO INADEQUATE DIETARY IRON INTAKE: Chronic | ICD-10-CM

## 2020-07-29 DIAGNOSIS — I10 ESSENTIAL HYPERTENSION: Primary | Chronic | ICD-10-CM

## 2020-07-29 PROCEDURE — G2025 DIS SITE TELE SVCS RHC/FQHC: HCPCS | Performed by: INTERNAL MEDICINE

## 2020-07-29 RX ORDER — ESCITALOPRAM OXALATE 5 MG/1
5 TABLET ORAL DAILY
Qty: 90 TABLET | Refills: 3 | Status: SHIPPED | OUTPATIENT
Start: 2020-07-29 | End: 2021-02-05 | Stop reason: SINTOL

## 2020-07-29 RX ORDER — TRAMADOL HYDROCHLORIDE 50 MG/1
50-100 TABLET ORAL 3 TIMES DAILY PRN
Qty: 180 TABLET | Refills: 0 | Status: SHIPPED | OUTPATIENT
Start: 2020-07-29 | End: 2021-02-05 | Stop reason: SDUPTHER

## 2020-07-29 RX ORDER — ALPRAZOLAM 0.5 MG/1
TABLET ORAL
Qty: 60 TABLET | Refills: 2 | Status: SHIPPED | OUTPATIENT
Start: 2020-07-29 | End: 2021-03-11

## 2020-07-29 NOTE — PROGRESS NOTES
Subjective      You have chosen to receive care through a telephone visit. Do you consent to use a telephone visit for your medical care today? Yes    Total visit time: 23 minutes.     History of Present Illness     Brittney Rollins is a 85 y.o. female who receives care via telephone visit for six-month follow up on hyperlipidemia, hypertension, GERD, osteoporosis, REY, and hypokalemia among other issues.  She has a mild thoracic aortic aneurysm which we follow approximately every 2 years.  Most recent CT angiogram 11/2019 revealed stable aortic aneurysm.     She is describing persistent intermittent paresthesias with tingling in left arm, which waxes and wanes on some days.  Denies family history of Parkinson's.  Neuro exam in January with in person visit revealed no evidence of cogwheeling rigidity.          She continues to take Xanax chronically for anxiety/sleep. This has been a challenging year with the Covid-19 pandemic.  We discussed adding SSRI or SNRI therapy.  Although, she has been reluctant to start medication for anxiety/depression in the past, she agrees to try. We started patient on Lexapro 10 mg  last year, although, she stopped it after taking it for a short time when she did not like the way she felt.  I am going to start a lower dose of Lexapro 5 mg, which she may tolerate better.       We had been monitoring an incidental finding of 8 mm left pulmonary nodule with serial CTs.  Repeat CT 03/2019 reveals no change in the lung nodule, strongly suggesting a benign process    She also struggles with chronic back and joint pain, for which she takes Tramadol and Tylenol.  Denies significant side effects including constipation or excessive daytime sedation.      She has been getting outdoors for walks.  Weight is stable.     Repeat DEXA 10/2019 revealed persistent osteoporosis of the hip and osteopenia of the lumbar spine similar to prior DEXA.   She has been intolerant to the Fosamax and Prolia in the  "past and has decided to continue taking calcium and vitamin D supplements without prescription osteoporosis medication.        The patient's relevant past medical, surgical, and social history was reviewed in Epic.   Lab results are reviewed with the patient today.  CMP was inadvertently omitted.  CBC unremarkable. A1c 5.3. Vitamin B-12 and vitamin D at goal.      Review of Systems   Constitutional: Negative for chills, fatigue and fever.   HENT: Negative for congestion, ear pain, postnasal drip, sinus pressure and sore throat.    Respiratory: Negative for cough, shortness of breath and wheezing.    Cardiovascular: Negative for chest pain, palpitations and leg swelling.   Gastrointestinal: Negative for abdominal pain, blood in stool, constipation, diarrhea, nausea and vomiting.   Endocrine: Negative for cold intolerance, heat intolerance, polydipsia and polyuria.   Genitourinary: Negative for dysuria, frequency, hematuria and urgency.   Musculoskeletal: Positive for arthralgias and back pain.   Skin: Negative for rash.   Neurological: Negative for syncope and weakness.   Psychiatric/Behavioral: Positive for dysphoric mood. The patient is nervous/anxious.         Objective     Visit Vitals  /73   Pulse 69   Ht 149.9 cm (59\")   Wt 54 kg (119 lb)   BMI 24.04 kg/m²     Physical Exam        Assessment/Plan      I am sending a prescription for low dose Lexapro 5 mg to take one q.d.  She could not tolerate the higher 10 mg dose or may have not given the medication long enough.  Continue the Xanax. Refill is sent for Xanax 0.5 mg to take 1/2 to 1 tab bid.  Patient understands the risks associated with this controlled medication, including tolerance and addiction.  She also agrees to only obtain this medication from me, and not from a another provider, unless that provider is covering for me in my absence. She also agrees to be compliant in dosing, and not self adjust the dose of medication.  A signed controlled " substance agreement is on file, and she has received a controlled substance education sheet at this a previous visit.  She has also signed a consent for treatment with a controlled substance as per [facility] policy. CONCHA was obtained.    Refill is sent for Tramadol 50 mg to take 1-2 tablets by mouth 3 (Three) Times a Day As Needed for moderate pain in combination with Tylenol.  Patient understands the risks associated with this controlled medication, including tolerance and addiction.  She also agrees to only obtain this medication from me, and not from a another provider, unless that provider is covering for me in my absence.  She also agrees to be compliant in dosing, and not self adjust the dose of medication.  A signed controlled substance agreement is on file, and she has received a controlled substance education sheet at this a previous visit.  She has also signed a consent for treatment with a controlled substance as per [facility] policy. CONCHA was obtained.    Continue calcium and vitamin D supplements.  She was intolerant to the Fosamax and Prolia in the past.  We will plan to repeat DEXA 10/2021.      Continue the oral iron and B-12 supplements.     Continue oral potassium.     Continue the vascular risk factor modifications, including Lipitor, lisinopril and atenolol.     Return in six months for follow up with fasting labs one week prior.         Scribed for Dr. Dillon by Sophie Méndez [facility].     Diagnoses and all orders for this visit:    Essential hypertension  -     CBC Auto Differential; Future  -     Comprehensive Metabolic Panel; Future    Mixed hyperlipidemia  -     Lipid Panel; Future  -     TSH; Future    Impaired fasting glucose  -     Hemoglobin A1c; Future    Generalized anxiety disorder  -     ALPRAZolam (XANAX) 0.5 MG tablet; 1/2 to 1 tab bid    Chronic bilateral low back pain without sciatica  -     traMADol (ULTRAM) 50 MG tablet; Take 1-2 tablets by mouth 3 (Three) Times  a Day As Needed for Moderate Pain .    Age-related osteoporosis without current pathological fracture  -     Vitamin D 25 Hydroxy; Future    Iron deficiency anemia secondary to inadequate dietary iron intake  -     Ferritin; Future  -     Iron Profile; Future    Vitamin B12 deficiency (dietary) anemia  -     Vitamin B12; Future    Dysthymia    Other orders  -     escitalopram (LEXAPRO) 5 MG tablet; Take 1 tablet by mouth Daily. For stress and nerves.        Lab on 07/22/2020   Component Date Value Ref Range Status   • WBC 07/22/2020 3.83  3.40 - 10.80 10*3/mm3 Final   • RBC 07/22/2020 4.12  3.77 - 5.28 10*6/mm3 Final   • Hemoglobin 07/22/2020 13.1  12.0 - 15.9 g/dL Final   • Hematocrit 07/22/2020 37.9  34.0 - 46.6 % Final   • MCV 07/22/2020 92.0  79.0 - 97.0 fL Final   • MCH 07/22/2020 31.8  26.6 - 33.0 pg Final   • MCHC 07/22/2020 34.6  31.5 - 35.7 g/dL Final   • RDW 07/22/2020 12.5  12.3 - 15.4 % Final   • RDW-SD 07/22/2020 41.1  37.0 - 54.0 fl Final   • MPV 07/22/2020 9.4  6.0 - 12.0 fL Final   • Platelets 07/22/2020 177  140 - 450 10*3/mm3 Final   • Neutrophil % 07/22/2020 43.0  42.7 - 76.0 % Final   • Lymphocyte % 07/22/2020 41.8  19.6 - 45.3 % Final   • Monocyte % 07/22/2020 11.2  5.0 - 12.0 % Final   • Eosinophil % 07/22/2020 3.7  0.3 - 6.2 % Final   • Basophil % 07/22/2020 0.3  0.0 - 1.5 % Final   • Neutrophils, Absolute 07/22/2020 1.65* 1.70 - 7.00 10*3/mm3 Final   • Lymphocytes, Absolute 07/22/2020 1.60  0.70 - 3.10 10*3/mm3 Final   • Monocytes, Absolute 07/22/2020 0.43  0.10 - 0.90 10*3/mm3 Final   • Eosinophils, Absolute 07/22/2020 0.14  0.00 - 0.40 10*3/mm3 Final   • Basophils, Absolute 07/22/2020 0.01  0.00 - 0.20 10*3/mm3 Final   • Hemoglobin A1C 07/22/2020 5.30  4.80 - 5.60 % Final   • LDL Cholesterol  07/22/2020 78  0 - 100 mg/dL Final   • Iron 07/22/2020 107  37 - 145 mcg/dL Final   • Iron Saturation 07/22/2020 35  20 - 50 % Final   • Transferrin 07/22/2020 206  200 - 360 mg/dL Final   • TIBC  07/22/2020 307  298 - 536 mcg/dL Final   • Ferritin 07/22/2020 479.00* 13.00 - 150.00 ng/mL Final   • Vitamin B-12 07/22/2020 705  211 - 946 pg/mL Final   • 25 Hydroxy, Vitamin D 07/22/2020 49.9  30.0 - 100.0 ng/ml Final   ]

## 2020-08-19 ENCOUNTER — OFFICE VISIT (OUTPATIENT)
Dept: FAMILY MEDICINE CLINIC | Facility: CLINIC | Age: 85
End: 2020-08-19

## 2020-08-19 VITALS — BODY MASS INDEX: 23.99 KG/M2 | HEIGHT: 59 IN | WEIGHT: 119 LBS

## 2020-08-19 DIAGNOSIS — Z00.00 MEDICARE ANNUAL WELLNESS VISIT, INITIAL: Primary | ICD-10-CM

## 2020-08-19 PROCEDURE — G0438 PPPS, INITIAL VISIT: HCPCS | Performed by: INTERNAL MEDICINE

## 2020-08-19 NOTE — PATIENT INSTRUCTIONS
Medicare Wellness  Personal Prevention Plan of Service     Date of Office Visit:  2020  Encounter Provider:  Pollo Dillon MD  Place of Service:  [facility]  Patient Name: Brittney Rollins  :  1934    As part of the Medicare Wellness portion of your visit today, we are providing you with this personalized preventive plan of services (PPPS). This plan is based upon recommendations of the [place] Preventive Services Task Force (USPSTF) and the Advisory Committee on Immunization Practices (ACIP).    This lists the preventive care services that should be considered, and provides dates of when you are due. Items listed as completed are up-to-date and do not require any further intervention.    Health Maintenance   Topic Date Due   • TDAP/TD VACCINES (1 - Tdap) 1945   • ZOSTER VACCINE (1 of 2) 1984   • MEDICARE ANNUAL WELLNESS  2016   • INFLUENZA VACCINE  2020   • LIPID PANEL  2021   • DXA SCAN  10/30/2021   • Pneumococcal Vaccine Once at 65 Years Old  Completed       No orders of the defined types were placed in this encounter.      Return in about 1 year (around 2021) for Medicare Wellness.

## 2020-08-19 NOTE — PROGRESS NOTES
The ABCs of the Annual Wellness Visit  Initial Medicare Wellness Visit    Chief Complaint   Patient presents with   • Medicare Wellness-Initial Visit       Subjective   History of Present Illness:  Brittney Rollins is a 85 y.o. female who presents for an Initial Medicare Wellness Visit.  You have chosen to receive care through a telephone visit. Do you consent to use a telephone visit for your medical care today? Yes      HEALTH RISK ASSESSMENT    Recent Hospitalizations:  No hospitalization(s) within the last year.    Current Medical Providers:  Patient Care Team:  Pollo Dillon MD as PCP - General (Internal Medicine)    Smoking Status:  Social History     Tobacco Use   Smoking Status Never Smoker   Smokeless Tobacco Never Used       Alcohol Consumption:  Social History     Substance and Sexual Activity   Alcohol Use No       Depression Screen:   PHQ-2/PHQ-9 Depression Screening 8/19/2020   Little interest or pleasure in doing things 0   Feeling down, depressed, or hopeless 0   Trouble falling or staying asleep, or sleeping too much -   Feeling tired or having little energy -   Poor appetite or overeating -   Feeling bad about yourself - or that you are a failure or have let yourself or your family down -   Trouble concentrating on things, such as reading the newspaper or watching television -   Moving or speaking so slowly that other people could have noticed. Or the opposite - being so fidgety or restless that you have been moving around a lot more than usual -   Thoughts that you would be better off dead, or of hurting yourself in some way -   Total Score 0   If you checked off any problems, how difficult have these problems made it for you to do your work, take care of things at home, or get along with other people? -       Fall Risk Screen:  STEADI Fall Risk Assessment was completed, and patient is at LOW risk for falls.Assessment completed on:8/19/2020    Health Habits and Functional and Cognitive  Screening:  Functional & Cognitive Status 8/19/2020   Do you have difficulty preparing food and eating? No   Do you have difficulty bathing yourself, getting dressed or grooming yourself? No   Do you have difficulty using the toilet? No   Do you have difficulty moving around from place to place? No   Do you have trouble with steps or getting out of a bed or a chair? Yes   Current Diet Well Balanced Diet   Dental Exam Not up to date   Eye Exam Not up to date   Exercise (times per week) 4 times per week   Current Exercise Activities Include Yard Work   Do you need help using the phone?  No   Are you deaf or do you have serious difficulty hearing?  Yes   Do you need help with transportation? No   Do you need help shopping? No   Do you need help preparing meals?  No   Do you need help with housework?  No   Do you need help with laundry? No   Do you need help taking your medications? No   Do you need help managing money? No   Do you ever drive or ride in a car without wearing a seat belt? No   Have you felt unusual stress, anger or loneliness in the last month? Yes   Who do you live with? Alone   If you need help, do you have trouble finding someone available to you? No   Have you been bothered in the last four weeks by sexual problems? No   Do you have difficulty concentrating, remembering or making decisions? Yes         Does the patient have evidence of cognitive impairment? No    Asprin use counseling:Does not need ASA (and currently is not on it)    Age-appropriate Screening Schedule:  Refer to the list below for future screening recommendations based on patient's age, sex and/or medical conditions. Orders for these recommended tests are listed in the plan section. The patient has been provided with a written plan.    Health Maintenance   Topic Date Due   • INFLUENZA VACCINE  08/01/2020   • TDAP/TD VACCINES (1 - Tdap) 08/19/2021 (Originally 12/23/1945)   • ZOSTER VACCINE (1 of 2) 08/19/2021 (Originally 12/23/1984)    • LIPID PANEL  07/22/2021   • DXA SCAN  10/30/2021          The following portions of the patient's history were reviewed and updated as appropriate:   She  has a past medical history of Anemia, Aneurysm, thoracic aortic (CMS/HCC), Benign essential tremor (1/15/2019), Chronic low back pain, Disorder of cardiovascular system, Dysthymia, Essential hypertension, Gastritis, Generalized anxiety disorder, H/O bone density study (06/12/2015), H/O mammogram (08/2012), Hyperlipidemia, Hypokalemia, Impaired fasting glucose, Iron deficiency anemia, Irritable bowel syndrome, Osteopenia, Osteoporosis, Peptic ulcer, Sleep disorder, and Vitamin B12 deficiency (dietary) anemia (1/15/2019).  She does not have any pertinent problems on file.  She  has a past surgical history that includes endoscopy and colonoscopy (09/2011); Injection of Medication (08/11/2015); and Breast biopsy (Left).  Her family history is not on file.  She  reports that she has never smoked. She has never used smokeless tobacco. She reports that she does not drink alcohol or use drugs.  Current Outpatient Medications   Medication Sig Dispense Refill   • ALPRAZolam (XANAX) 0.5 MG tablet 1/2 to 1 tab bid 60 tablet 2   • atenolol (TENORMIN) 50 MG tablet TAKE 1 TABLET BY MOUTH DAILY FOR BLOOD PRESSURE AND HEART 90 tablet 3   • atorvastatin (LIPITOR) 10 MG tablet TAKE 1 TABLET BY MOUTH DAILY FOR CHOLESTEROL 90 tablet 3   • Biotin 16534 MCG tablet dispersible Take 1 tablet by mouth Daily.     • Calcium Carb-Ergocalciferol 500-200 MG-UNIT tablet Take 1 tablet by mouth Daily.     • cholecalciferol (VITAMIN D3) 1000 UNITS tablet Take 1,000 Units by mouth Daily.     • escitalopram (LEXAPRO) 5 MG tablet Take 1 tablet by mouth Daily. For stress and nerves. 90 tablet 3   • ferrous sulfate 325 (65 FE) MG tablet Take 1 tablet by mouth 1 (One) Time Per Week.  3   • Insulin Pen Needle (PEN NEEDLES) 30G X 5 MM misc Inject 1 each under the skin Daily. For use with Forteo 100  each 3   • lisinopril (PRINIVIL,ZESTRIL) 10 MG tablet Take 1 tablet by mouth Daily. 90 tablet 0   • meclizine (ANTIVERT) 25 MG tablet TAKE 1 TABLET BY MOUTH FOUR TIMES A DAY AS NEEDED 30 tablet 2   • naproxen (NAPROSYN) 375 MG tablet Take one tab daily with food prn mild pain 90 tablet 3   • Omega-3 Fatty Acids (FISH OIL CONCENTRATE) 1000 MG capsule Take 1 capsule by mouth Daily.     • omeprazole (priLOSEC) 40 MG capsule TAKE 1 CAPSULE BY MOUTH EVERY MORNING 90 capsule 3   • potassium chloride (K-DUR) 10 MEQ CR tablet TAKE 1 TABLET BY MOUTH DAILY 90 tablet 3   • traMADol (ULTRAM) 50 MG tablet Take 1-2 tablets by mouth 3 (Three) Times a Day As Needed for Moderate Pain . 180 tablet 0   • vitamin B-12 (CYANOCOBALAMIN) 1000 MCG tablet Take 1,000 mcg by mouth Daily.       No current facility-administered medications for this visit.      Current Outpatient Medications on File Prior to Visit   Medication Sig   • ALPRAZolam (XANAX) 0.5 MG tablet 1/2 to 1 tab bid   • atenolol (TENORMIN) 50 MG tablet TAKE 1 TABLET BY MOUTH DAILY FOR BLOOD PRESSURE AND HEART   • atorvastatin (LIPITOR) 10 MG tablet TAKE 1 TABLET BY MOUTH DAILY FOR CHOLESTEROL   • Biotin 95321 MCG tablet dispersible Take 1 tablet by mouth Daily.   • Calcium Carb-Ergocalciferol 500-200 MG-UNIT tablet Take 1 tablet by mouth Daily.   • cholecalciferol (VITAMIN D3) 1000 UNITS tablet Take 1,000 Units by mouth Daily.   • escitalopram (LEXAPRO) 5 MG tablet Take 1 tablet by mouth Daily. For stress and nerves.   • ferrous sulfate 325 (65 FE) MG tablet Take 1 tablet by mouth 1 (One) Time Per Week.   • Insulin Pen Needle (PEN NEEDLES) 30G X 5 MM misc Inject 1 each under the skin Daily. For use with Forteo   • lisinopril (PRINIVIL,ZESTRIL) 10 MG tablet Take 1 tablet by mouth Daily.   • meclizine (ANTIVERT) 25 MG tablet TAKE 1 TABLET BY MOUTH FOUR TIMES A DAY AS NEEDED   • naproxen (NAPROSYN) 375 MG tablet Take one tab daily with food prn mild pain   • Omega-3 Fatty Acids  (FISH OIL CONCENTRATE) 1000 MG capsule Take 1 capsule by mouth Daily.   • omeprazole (priLOSEC) 40 MG capsule TAKE 1 CAPSULE BY MOUTH EVERY MORNING   • potassium chloride (K-DUR) 10 MEQ CR tablet TAKE 1 TABLET BY MOUTH DAILY   • traMADol (ULTRAM) 50 MG tablet Take 1-2 tablets by mouth 3 (Three) Times a Day As Needed for Moderate Pain .   • vitamin B-12 (CYANOCOBALAMIN) 1000 MCG tablet Take 1,000 mcg by mouth Daily.     No current facility-administered medications on file prior to visit.      She is allergic to fosamax [alendronate]; nsaids; phenergan [promethazine hcl]; sulfa antibiotics; paxil [paroxetine hcl]; and prolia [denosumab]..    Outpatient Medications Prior to Visit   Medication Sig Dispense Refill   • ALPRAZolam (XANAX) 0.5 MG tablet 1/2 to 1 tab bid 60 tablet 2   • atenolol (TENORMIN) 50 MG tablet TAKE 1 TABLET BY MOUTH DAILY FOR BLOOD PRESSURE AND HEART 90 tablet 3   • atorvastatin (LIPITOR) 10 MG tablet TAKE 1 TABLET BY MOUTH DAILY FOR CHOLESTEROL 90 tablet 3   • Biotin 45308 MCG tablet dispersible Take 1 tablet by mouth Daily.     • Calcium Carb-Ergocalciferol 500-200 MG-UNIT tablet Take 1 tablet by mouth Daily.     • cholecalciferol (VITAMIN D3) 1000 UNITS tablet Take 1,000 Units by mouth Daily.     • escitalopram (LEXAPRO) 5 MG tablet Take 1 tablet by mouth Daily. For stress and nerves. 90 tablet 3   • ferrous sulfate 325 (65 FE) MG tablet Take 1 tablet by mouth 1 (One) Time Per Week.  3   • Insulin Pen Needle (PEN NEEDLES) 30G X 5 MM misc Inject 1 each under the skin Daily. For use with Forteo 100 each 3   • lisinopril (PRINIVIL,ZESTRIL) 10 MG tablet Take 1 tablet by mouth Daily. 90 tablet 0   • meclizine (ANTIVERT) 25 MG tablet TAKE 1 TABLET BY MOUTH FOUR TIMES A DAY AS NEEDED 30 tablet 2   • naproxen (NAPROSYN) 375 MG tablet Take one tab daily with food prn mild pain 90 tablet 3   • Omega-3 Fatty Acids (FISH OIL CONCENTRATE) 1000 MG capsule Take 1 capsule by mouth Daily.     • omeprazole  "(priLOSEC) 40 MG capsule TAKE 1 CAPSULE BY MOUTH EVERY MORNING 90 capsule 3   • potassium chloride (K-DUR) 10 MEQ CR tablet TAKE 1 TABLET BY MOUTH DAILY 90 tablet 3   • traMADol (ULTRAM) 50 MG tablet Take 1-2 tablets by mouth 3 (Three) Times a Day As Needed for Moderate Pain . 180 tablet 0   • vitamin B-12 (CYANOCOBALAMIN) 1000 MCG tablet Take 1,000 mcg by mouth Daily.       No facility-administered medications prior to visit.        Patient Active Problem List   Diagnosis   • Sleep disorder   • Peptic ulcer   • Age-related osteoporosis without current pathological fracture   • Osteopenia of lumbar spine   • Irritable bowel syndrome   • Iron deficiency anemia   • Impaired fasting glucose   • Hypokalemia   • Hyperlipidemia   • H/O mammogram   • H/O bone density study   • Generalized anxiety disorder   • Gastritis   • Essential hypertension   • Dysthymia   • Disorder of cardiovascular system   • Chronic low back pain   • Aneurysm, thoracic aortic (CMS/HCC)   • Anemia   • Nodule of left lung   • Vitamin B12 deficiency (dietary) anemia   • Benign essential tremor       Advanced Care Planning:  ACP discussion was held with the patient during this visit. Patient does not have an advance directive, information provided.    Review of Systems    Compared to one year ago, the patient feels her physical health is the same.  Compared to one year ago, the patient feels her mental health is the same.    Reviewed chart for potential of high risk medication in the elderly: yes  Reviewed chart for potential of harmful drug interactions in the elderly:yes    Objective         Vitals:    08/19/20 0906   Weight: 54 kg (119 lb)   Height: 149.9 cm (59\")   PainSc:   4   PainLoc: Back       Body mass index is 24.04 kg/m².  Discussed the patient's BMI with her. The BMI is in the acceptable range.    Physical Exam    Lab Results   Component Value Date    LDL 78 07/22/2020    HGBA1C 5.30 07/22/2020        Assessment/Plan   Medicare Risks and " Personalized Health Plan  CMS Preventative Services Quick Reference  Advance Directive Discussion  Immunizations Discussed/Encouraged (specific immunizations; adacel Tdap and Shingrix )    The above risks/problems have been discussed with the patient.  She declines tetanus booster and Shingrix vaccination.  Pertinent information has been shared with the patient in the After Visit Summary.  Follow up plans and orders are seen below in the Assessment/Plan Section.    Diagnoses and all orders for this visit:    1. Medicare annual wellness visit, initial (Primary)      Follow Up:  Return in about 1 year (around 8/19/2021) for Medicare Wellness.     An After Visit Summary and PPPS were given to the patient.

## 2020-09-01 RX ORDER — FERROUS SULFATE 325(65) MG
TABLET ORAL
Qty: 45 TABLET | Refills: 3 | Status: SHIPPED | OUTPATIENT
Start: 2020-09-01 | End: 2021-02-05 | Stop reason: SDUPTHER

## 2020-09-01 RX ORDER — ATENOLOL 50 MG/1
TABLET ORAL
Qty: 90 TABLET | Refills: 3 | Status: SHIPPED | OUTPATIENT
Start: 2020-09-01 | End: 2021-08-31

## 2020-09-01 RX ORDER — ATORVASTATIN CALCIUM 10 MG/1
TABLET, FILM COATED ORAL
Qty: 90 TABLET | Refills: 3 | Status: SHIPPED | OUTPATIENT
Start: 2020-09-01 | End: 2021-08-31

## 2020-12-09 RX ORDER — OMEPRAZOLE 40 MG/1
CAPSULE, DELAYED RELEASE ORAL
Qty: 90 CAPSULE | Refills: 3 | Status: SHIPPED | OUTPATIENT
Start: 2020-12-09 | End: 2021-09-28 | Stop reason: SDUPTHER

## 2020-12-09 RX ORDER — POTASSIUM CHLORIDE 750 MG/1
10 TABLET, FILM COATED, EXTENDED RELEASE ORAL DAILY
Qty: 90 TABLET | Refills: 3 | Status: SHIPPED | OUTPATIENT
Start: 2020-12-09 | End: 2021-08-31

## 2021-01-29 ENCOUNTER — LAB (OUTPATIENT)
Dept: LAB | Facility: OTHER | Age: 86
End: 2021-01-29

## 2021-01-29 DIAGNOSIS — M81.0 AGE-RELATED OSTEOPOROSIS WITHOUT CURRENT PATHOLOGICAL FRACTURE: Chronic | ICD-10-CM

## 2021-01-29 DIAGNOSIS — D51.8 VITAMIN B12 DEFICIENCY (DIETARY) ANEMIA: Chronic | ICD-10-CM

## 2021-01-29 DIAGNOSIS — I10 ESSENTIAL HYPERTENSION: Chronic | ICD-10-CM

## 2021-01-29 DIAGNOSIS — D50.8 IRON DEFICIENCY ANEMIA SECONDARY TO INADEQUATE DIETARY IRON INTAKE: Chronic | ICD-10-CM

## 2021-01-29 DIAGNOSIS — R73.01 IMPAIRED FASTING GLUCOSE: Chronic | ICD-10-CM

## 2021-01-29 DIAGNOSIS — E78.2 MIXED HYPERLIPIDEMIA: Chronic | ICD-10-CM

## 2021-01-29 LAB
ALBUMIN SERPL-MCNC: 4.4 G/DL (ref 3.5–5)
ALBUMIN/GLOB SERPL: 1.5 G/DL (ref 1.1–1.8)
ALP SERPL-CCNC: 74 U/L (ref 38–126)
ALT SERPL W P-5'-P-CCNC: 22 U/L
ANION GAP SERPL CALCULATED.3IONS-SCNC: 10 MMOL/L (ref 5–15)
AST SERPL-CCNC: 36 U/L (ref 14–36)
BASOPHILS # BLD AUTO: 0.02 10*3/MM3 (ref 0–0.2)
BASOPHILS NFR BLD AUTO: 0.5 % (ref 0–1.5)
BILIRUB SERPL-MCNC: 1.1 MG/DL (ref 0.2–1.3)
BUN SERPL-MCNC: 6 MG/DL (ref 7–23)
BUN/CREAT SERPL: 6.5 (ref 7–25)
CALCIUM SPEC-SCNC: 9.2 MG/DL (ref 8.4–10.2)
CHLORIDE SERPL-SCNC: 93 MMOL/L (ref 101–112)
CHOLEST SERPL-MCNC: 152 MG/DL (ref 150–200)
CO2 SERPL-SCNC: 23 MMOL/L (ref 22–30)
CREAT SERPL-MCNC: 0.93 MG/DL (ref 0.52–1.04)
DEPRECATED RDW RBC AUTO: 38.7 FL (ref 37–54)
EOSINOPHIL # BLD AUTO: 0.17 10*3/MM3 (ref 0–0.4)
EOSINOPHIL NFR BLD AUTO: 4.2 % (ref 0.3–6.2)
ERYTHROCYTE [DISTWIDTH] IN BLOOD BY AUTOMATED COUNT: 11.9 % (ref 12.3–15.4)
GFR SERPL CREATININE-BSD FRML MDRD: 57 ML/MIN/1.73 (ref 39–90)
GLOBULIN UR ELPH-MCNC: 3 GM/DL (ref 2.3–3.5)
GLUCOSE SERPL-MCNC: 102 MG/DL (ref 70–99)
HBA1C MFR BLD: 5.55 % (ref 4.8–5.6)
HCT VFR BLD AUTO: 34.9 % (ref 34–46.6)
HDLC SERPL-MCNC: 54 MG/DL (ref 40–59)
HGB BLD-MCNC: 12.1 G/DL (ref 12–15.9)
LDLC SERPL CALC-MCNC: 75 MG/DL
LDLC/HDLC SERPL: 1.33 {RATIO} (ref 0–3.22)
LYMPHOCYTES # BLD AUTO: 1.56 10*3/MM3 (ref 0.7–3.1)
LYMPHOCYTES NFR BLD AUTO: 38.5 % (ref 19.6–45.3)
MCH RBC QN AUTO: 31.4 PG (ref 26.6–33)
MCHC RBC AUTO-ENTMCNC: 34.7 G/DL (ref 31.5–35.7)
MCV RBC AUTO: 90.6 FL (ref 79–97)
MONOCYTES # BLD AUTO: 0.41 10*3/MM3 (ref 0.1–0.9)
MONOCYTES NFR BLD AUTO: 10.1 % (ref 5–12)
NEUTROPHILS NFR BLD AUTO: 1.89 10*3/MM3 (ref 1.7–7)
NEUTROPHILS NFR BLD AUTO: 46.7 % (ref 42.7–76)
PLATELET # BLD AUTO: 181 10*3/MM3 (ref 140–450)
PMV BLD AUTO: 9.2 FL (ref 6–12)
POTASSIUM SERPL-SCNC: 3.9 MMOL/L (ref 3.4–5)
PROT SERPL-MCNC: 7.4 G/DL (ref 6.3–8.6)
RBC # BLD AUTO: 3.85 10*6/MM3 (ref 3.77–5.28)
SODIUM SERPL-SCNC: 126 MMOL/L (ref 137–145)
TRIGL SERPL-MCNC: 131 MG/DL
VLDLC SERPL-MCNC: 23 MG/DL (ref 5–40)
WBC # BLD AUTO: 4.05 10*3/MM3 (ref 3.4–10.8)

## 2021-01-29 PROCEDURE — 84443 ASSAY THYROID STIM HORMONE: CPT | Performed by: INTERNAL MEDICINE

## 2021-01-29 PROCEDURE — 36415 COLL VENOUS BLD VENIPUNCTURE: CPT | Performed by: INTERNAL MEDICINE

## 2021-01-29 PROCEDURE — 82728 ASSAY OF FERRITIN: CPT | Performed by: INTERNAL MEDICINE

## 2021-01-29 PROCEDURE — 83540 ASSAY OF IRON: CPT | Performed by: INTERNAL MEDICINE

## 2021-01-29 PROCEDURE — 80061 LIPID PANEL: CPT | Performed by: INTERNAL MEDICINE

## 2021-01-29 PROCEDURE — 84466 ASSAY OF TRANSFERRIN: CPT | Performed by: INTERNAL MEDICINE

## 2021-01-29 PROCEDURE — 82306 VITAMIN D 25 HYDROXY: CPT | Performed by: INTERNAL MEDICINE

## 2021-01-29 PROCEDURE — 80053 COMPREHEN METABOLIC PANEL: CPT | Performed by: INTERNAL MEDICINE

## 2021-01-29 PROCEDURE — 82607 VITAMIN B-12: CPT | Performed by: INTERNAL MEDICINE

## 2021-01-29 PROCEDURE — 83036 HEMOGLOBIN GLYCOSYLATED A1C: CPT | Performed by: INTERNAL MEDICINE

## 2021-01-29 PROCEDURE — 85025 COMPLETE CBC W/AUTO DIFF WBC: CPT | Performed by: INTERNAL MEDICINE

## 2021-01-30 LAB
25(OH)D3 SERPL-MCNC: 64.7 NG/ML (ref 30–100)
FERRITIN SERPL-MCNC: 622 NG/ML (ref 13–150)
IRON 24H UR-MRATE: 125 MCG/DL (ref 37–145)
IRON SATN MFR SERPL: 45 % (ref 20–50)
TIBC SERPL-MCNC: 280 MCG/DL (ref 298–536)
TRANSFERRIN SERPL-MCNC: 188 MG/DL (ref 200–360)
TSH SERPL DL<=0.05 MIU/L-ACNC: 2.26 UIU/ML (ref 0.27–4.2)
VIT B12 BLD-MCNC: 833 PG/ML (ref 211–946)

## 2021-02-05 ENCOUNTER — OFFICE VISIT (OUTPATIENT)
Dept: FAMILY MEDICINE CLINIC | Facility: CLINIC | Age: 86
End: 2021-02-05

## 2021-02-05 VITALS
WEIGHT: 119 LBS | DIASTOLIC BLOOD PRESSURE: 70 MMHG | SYSTOLIC BLOOD PRESSURE: 128 MMHG | HEIGHT: 59 IN | BODY MASS INDEX: 23.99 KG/M2

## 2021-02-05 DIAGNOSIS — F41.1 GENERALIZED ANXIETY DISORDER: Chronic | ICD-10-CM

## 2021-02-05 DIAGNOSIS — G89.29 CHRONIC BILATERAL LOW BACK PAIN WITHOUT SCIATICA: ICD-10-CM

## 2021-02-05 DIAGNOSIS — R73.01 IMPAIRED FASTING GLUCOSE: Chronic | ICD-10-CM

## 2021-02-05 DIAGNOSIS — I10 ESSENTIAL HYPERTENSION: Chronic | ICD-10-CM

## 2021-02-05 DIAGNOSIS — F33.41 RECURRENT MAJOR DEPRESSIVE DISORDER, IN PARTIAL REMISSION (HCC): Chronic | ICD-10-CM

## 2021-02-05 DIAGNOSIS — M85.88 OSTEOPENIA OF LUMBAR SPINE: Chronic | ICD-10-CM

## 2021-02-05 DIAGNOSIS — D50.8 IRON DEFICIENCY ANEMIA SECONDARY TO INADEQUATE DIETARY IRON INTAKE: Chronic | ICD-10-CM

## 2021-02-05 DIAGNOSIS — K29.30 CHRONIC SUPERFICIAL GASTRITIS WITHOUT BLEEDING: Chronic | ICD-10-CM

## 2021-02-05 DIAGNOSIS — R91.1 NODULE OF LEFT LUNG: Chronic | ICD-10-CM

## 2021-02-05 DIAGNOSIS — I71.20 THORACIC AORTIC ANEURYSM WITHOUT RUPTURE (HCC): Chronic | ICD-10-CM

## 2021-02-05 DIAGNOSIS — D51.8 VITAMIN B12 DEFICIENCY (DIETARY) ANEMIA: Chronic | ICD-10-CM

## 2021-02-05 DIAGNOSIS — G47.9 SLEEP DISORDER: Chronic | ICD-10-CM

## 2021-02-05 DIAGNOSIS — M81.0 AGE-RELATED OSTEOPOROSIS WITHOUT CURRENT PATHOLOGICAL FRACTURE: Chronic | ICD-10-CM

## 2021-02-05 DIAGNOSIS — E78.2 MIXED HYPERLIPIDEMIA: Primary | Chronic | ICD-10-CM

## 2021-02-05 DIAGNOSIS — M54.50 CHRONIC BILATERAL LOW BACK PAIN WITHOUT SCIATICA: ICD-10-CM

## 2021-02-05 PROCEDURE — G2025 DIS SITE TELE SVCS RHC/FQHC: HCPCS | Performed by: INTERNAL MEDICINE

## 2021-02-05 RX ORDER — DULOXETIN HYDROCHLORIDE 60 MG/1
60 CAPSULE, DELAYED RELEASE ORAL DAILY
Qty: 30 CAPSULE | Refills: 11 | Status: SHIPPED | OUTPATIENT
Start: 2021-02-05 | End: 2022-10-26 | Stop reason: ALTCHOICE

## 2021-02-05 RX ORDER — FERROUS SULFATE 325(65) MG
1 TABLET ORAL EVERY OTHER DAY
Qty: 45 TABLET | Refills: 3 | Status: SHIPPED | OUTPATIENT
Start: 2021-02-05 | End: 2021-09-28 | Stop reason: SDUPTHER

## 2021-02-05 RX ORDER — TRAMADOL HYDROCHLORIDE 50 MG/1
50-100 TABLET ORAL 3 TIMES DAILY PRN
Qty: 180 TABLET | Refills: 1 | Status: SHIPPED | OUTPATIENT
Start: 2021-02-05 | End: 2021-09-28 | Stop reason: SDUPTHER

## 2021-02-05 NOTE — PROGRESS NOTES
"You have chosen to receive care through a telephone visit. Do you consent to use a telephone visit for your medical care today? Yes    Total visit time: 34 minutes.      Chief Complaint  Follow-up (6 month) and Back Pain (she usually takes Tramadol but ran out and a friend gave her Lortab 1 pill and it helped more. Wanta to see about alternating them)    Subjective            History of Present Illness     Brittney Rollins receives care via telephone visit through  [facility] [place] for 6-month follow up on hyperlipidemia, hypertension, GERD, osteoporosis, REY, and hypokalemia among other issues.      She has a mild thoracic aortic aneurysm which we follow approximately every 2 years.  Most recent CT angiogram 11/2019 revealed stable thoracic aortic aneurysm.  She will be due to repeat this later this year (11/2021).  We are also monitoring an incidental finding of 8 mm left pulmonary nodule.   CT 03/2019 revealed no change in the lung nodule, strongly suggesting a benign process.  We will plan on repeating CT this fall to monitor both issues.     She continues to take Xanax chronically for anxiety/sleep.  We started patient on Lexapro, although, she stopped it after taking it for a short time due to nausea, appetite suppression, and just didn't \"feel like her stomach felt right..\"   We discussed options and I recommended we try Cymbalta, which would not only help with REY/dysthymia, but should also help manage her chronic chronic back and osteoarthritic pain. She reluctantly agrees to try.  She takes Tramadol and Tylenol and denies significant side effects including constipation.  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  A friend gave her a Norco, which she reports she tried and took with good results and is asking about a prescription. I told her the combination of Cymbalta and Tramadol/Tylenol should improve pain management and " "denied her request for narcotic pain medication in this elderly patient.            DEXA 10/2019 revealed persistent osteoporosis of the hip and osteopenia of the lumbar spine similar to prior DEXA.   She has been intolerant to the Fosamax and Prolia in the past and has decided to continue taking calcium and vitamin D supplements without prescription osteoporosis medication.  Vitamin D at goal.  We will plan to repeat DEXA 10/2021.       Blood pressure at goal.  She maintains a goal body weight.     Iron level has drifted down.  In discussion, she has been out of the oral iron, which I asked her to resume.    The patient's relevant past medical, surgical, and social history was reviewed in Epic.   Lab results are reviewed with the patient today.  Fasting glucose 102. A1c 5.55. Normal renal and liver function.  Normal thyroid function. Total cholesterol 152.  HDL 54.  LDL 75.  Triglycerides 131.  LDL/HDL ratio 1.33.  Vitamin B-12 and vitamin D at goal.      Objective   Vital Signs:   /70   Ht 149.9 cm (59\")   Wt 54 kg (119 lb)   BMI 24.04 kg/m²       Physical Exam   Result Review :     CMP    CMP 1/29/21   BUN 6 (A)   Creatinine 0.93   eGFR Non African Am 57   Sodium 126 (A)   Potassium 3.9   Chloride 93 (A)   Calcium 9.2   Albumin 4.40   Total Bilirubin 1.1   Alkaline Phosphatase 74   AST (SGOT) 36   ALT (SGPT) 22   (A) Abnormal value            CBC w/diff    CBC w/Diff 7/22/20 1/29/21   WBC 3.83 4.05   RBC 4.12 3.85   Hemoglobin 13.1 12.1   Hematocrit 37.9 34.9   MCV 92.0 90.6   MCH 31.8 31.4   MCHC 34.6 34.7   RDW 12.5 11.9 (A)   Platelets 177 181   Neutrophil Rel % 43.0 46.7   Lymphocyte Rel % 41.8 38.5   Monocyte Rel % 11.2 10.1   Eosinophil Rel % 3.7 4.2   Basophil Rel % 0.3 0.5   (A) Abnormal value            Lipid Panel    Lipid Panel 7/22/20 1/29/21   Triglycerides  131   HDL Cholesterol  54   VLDL Cholesterol  23   LDL Cholesterol  78 75   LDL/HDL Ratio  1.33           TSH    TSH 1/29/21   TSH " 2.260           A1C Last 3 Results    HGBA1C Last 3 Results 7/22/20 1/29/21   Hemoglobin A1C 5.30 5.55           Data reviewed: Radiologic studies CT angiogram 11/2019          Assessment and Plan    Problem List Items Addressed This Visit        Cardiac and Vasculature    Aneurysm, thoracic aortic (CMS/HCC) -4.4 cm (Chronic)    Essential hypertension (Chronic)    Relevant Orders    Comprehensive Metabolic Panel    Hyperlipidemia - Primary (Chronic)    Overview     on lipitor         Relevant Orders    CBC Auto Differential    Comprehensive Metabolic Panel    LDL Cholesterol, Direct       Endocrine and Metabolic    Impaired fasting glucose (Chronic)    Overview     impaired fasting glycaemia         Relevant Orders    Hemoglobin A1c       Gastrointestinal Abdominal     Gastritis (Chronic)       Hematology and Neoplasia    Iron deficiency anemia (Chronic)    Overview     started niferex 6/2015         Relevant Medications    ferrous sulfate (FeroSul) 325 (65 FE) MG tablet    Other Relevant Orders    Ferritin    Iron Profile    Vitamin B12 deficiency (dietary) anemia (Chronic)    Relevant Medications    ferrous sulfate (FeroSul) 325 (65 FE) MG tablet    Other Relevant Orders    Vitamin B12       Mental Health    Generalized anxiety disorder (Chronic)    Relevant Medications    DULoxetine (CYMBALTA) 60 MG capsule    Recurrent major depressive disorder, in partial remission (CMS/HCC) (Chronic)    Relevant Medications    DULoxetine (CYMBALTA) 60 MG capsule       Musculoskeletal and Injuries    Age-related osteoporosis without current pathological fracture (Chronic)    Overview     Osteopenia progressed to osteoporosis, 6/2015. Started Prolia injections, 6/2015, but patient discontinued Prolia due to adverse reaction after 1st dose. Declines other treatment, 7/2016. Next DEXA, 7/2017            Relevant Orders    Vitamin D 25 Hydroxy    Chronic low back pain (Chronic)    Relevant Medications    traMADol (ULTRAM) 50 MG  tablet    Osteopenia of lumbar spine (Chronic)    Relevant Orders    Vitamin D 25 Hydroxy       Pulmonary and Pneumonias    Nodule of left lung (Chronic)       Sleep    Sleep disorder (Chronic)          Refill is sent for Tramadol 50 mg to take 1-2 tablets by mouth 3 (Three) Times a Day As Needed for moderate pain in combination with Tylenol. I sent a prescription for Cymbalta to replace the Lexapro, which she has already stopped due to reported GI intolerance.  The Cymbalta should help manage her chronic back and osteoarthritic pain.   Patient understands the risks associated with this controlled medication, including tolerance and addiction.  She also agrees to only obtain this medication from me, and not from a another provider, unless that provider is covering for me in my absence.  She also agrees to be compliant in dosing, and not self adjust the dose of medication.  A signed controlled substance agreement is on file, and she has received a controlled substance education sheet at this a previous visit.  She has also signed a consent for treatment with a controlled substance as per [facility] policy. [facility] was obtained.    We will plan on repeating CT angiogram this fall to re-evaluate stable thoracic anuersym and also re-evaluate incidental finding of 8 mm left pulmonary nodule.       Continue calcium and vitamin D supplements. Vitamin D at goal.  She was intolerant to Fosamax and Prolia in the past.  We will plan to repeat DEXA 10/2021.       Resume the oral iron. Iron has drifted down without the oral supplement.   Continue B-12 supplements.     We have added patient to list at [facility] for COVID vaccine.       Continue oral potassium.  Hypokalemia at goal.       Continue the vascular risk factor modifications, including Lipitor, lisinopril and atenolol.  Cholesterol at goal.  Blood pressure at goal.  Continue to monitor BP/heart rate and notify me if not consistently at goal.         Return in six months for follow up with fasting labs one week prior.        Scribed for Dr. Dillon by Sophie Méndez [facility].       Follow Up   Return in about 6 months (around 8/5/2021) for Follow up in six months with labs one week prior., Next scheduled follow up - labs 1 week prior.  Patient was given instructions and counseling regarding her condition or for health maintenance advice. Please see specific information pulled into the AVS if appropriate.

## 2021-03-08 DIAGNOSIS — F41.1 GENERALIZED ANXIETY DISORDER: ICD-10-CM

## 2021-03-11 RX ORDER — ALPRAZOLAM 0.5 MG/1
TABLET ORAL
Qty: 60 TABLET | Refills: 2 | Status: SHIPPED | OUTPATIENT
Start: 2021-03-11 | End: 2021-11-22

## 2021-04-05 ENCOUNTER — IMMUNIZATION (OUTPATIENT)
Dept: VACCINE CLINIC | Facility: HOSPITAL | Age: 86
End: 2021-04-05

## 2021-04-05 PROCEDURE — 0001A: CPT | Performed by: THORACIC SURGERY (CARDIOTHORACIC VASCULAR SURGERY)

## 2021-04-05 PROCEDURE — 91300 HC SARSCOV02 VAC 30MCG/0.3ML IM: CPT | Performed by: THORACIC SURGERY (CARDIOTHORACIC VASCULAR SURGERY)

## 2021-04-06 RX ORDER — LISINOPRIL 10 MG/1
TABLET ORAL
Qty: 90 TABLET | Refills: 3 | Status: SHIPPED | OUTPATIENT
Start: 2021-04-06 | End: 2021-09-13 | Stop reason: SDUPTHER

## 2021-04-26 ENCOUNTER — IMMUNIZATION (OUTPATIENT)
Dept: VACCINE CLINIC | Facility: HOSPITAL | Age: 86
End: 2021-04-26

## 2021-04-26 PROCEDURE — 0002A: CPT | Performed by: THORACIC SURGERY (CARDIOTHORACIC VASCULAR SURGERY)

## 2021-04-26 PROCEDURE — 91300 HC SARSCOV02 VAC 30MCG/0.3ML IM: CPT | Performed by: THORACIC SURGERY (CARDIOTHORACIC VASCULAR SURGERY)

## 2021-05-10 DIAGNOSIS — G89.29 CHRONIC BILATERAL LOW BACK PAIN WITHOUT SCIATICA: ICD-10-CM

## 2021-05-10 DIAGNOSIS — M54.50 CHRONIC BILATERAL LOW BACK PAIN WITHOUT SCIATICA: ICD-10-CM

## 2021-05-10 RX ORDER — TRAMADOL HYDROCHLORIDE 50 MG/1
50-100 TABLET ORAL 3 TIMES DAILY PRN
Qty: 180 TABLET | Refills: 1 | OUTPATIENT
Start: 2021-05-10

## 2021-06-07 DIAGNOSIS — F41.1 GENERALIZED ANXIETY DISORDER: ICD-10-CM

## 2021-06-07 RX ORDER — ALPRAZOLAM 0.5 MG/1
TABLET ORAL
Qty: 60 TABLET | Refills: 2 | OUTPATIENT
Start: 2021-06-07

## 2021-08-30 DIAGNOSIS — G89.29 CHRONIC BILATERAL LOW BACK PAIN WITHOUT SCIATICA: ICD-10-CM

## 2021-08-30 DIAGNOSIS — F41.1 GENERALIZED ANXIETY DISORDER: ICD-10-CM

## 2021-08-30 DIAGNOSIS — M54.50 CHRONIC BILATERAL LOW BACK PAIN WITHOUT SCIATICA: ICD-10-CM

## 2021-08-31 RX ORDER — ATORVASTATIN CALCIUM 10 MG/1
TABLET, FILM COATED ORAL
Qty: 15 TABLET | Refills: 0 | Status: SHIPPED | OUTPATIENT
Start: 2021-08-31 | End: 2021-09-28 | Stop reason: SDUPTHER

## 2021-08-31 RX ORDER — TRAMADOL HYDROCHLORIDE 50 MG/1
50-100 TABLET ORAL 3 TIMES DAILY PRN
Qty: 180 TABLET | Refills: 1 | OUTPATIENT
Start: 2021-08-31

## 2021-08-31 RX ORDER — ATENOLOL 50 MG/1
TABLET ORAL
Qty: 15 TABLET | Refills: 0 | Status: SHIPPED | OUTPATIENT
Start: 2021-08-31 | End: 2021-09-28 | Stop reason: SDUPTHER

## 2021-08-31 RX ORDER — POTASSIUM CHLORIDE 750 MG/1
10 TABLET, FILM COATED, EXTENDED RELEASE ORAL DAILY
Qty: 15 TABLET | Refills: 0 | Status: SHIPPED | OUTPATIENT
Start: 2021-08-31 | End: 2021-09-28 | Stop reason: SDUPTHER

## 2021-08-31 RX ORDER — ALPRAZOLAM 0.5 MG/1
TABLET ORAL
Qty: 60 TABLET | Refills: 2 | OUTPATIENT
Start: 2021-08-31

## 2021-09-13 RX ORDER — LISINOPRIL 10 MG/1
10 TABLET ORAL DAILY
Qty: 14 TABLET | Refills: 0 | Status: SHIPPED | OUTPATIENT
Start: 2021-09-13 | End: 2021-09-28 | Stop reason: SDUPTHER

## 2021-09-16 ENCOUNTER — LAB (OUTPATIENT)
Dept: LAB | Facility: OTHER | Age: 86
End: 2021-09-16

## 2021-09-16 DIAGNOSIS — I10 ESSENTIAL HYPERTENSION: Chronic | ICD-10-CM

## 2021-09-16 DIAGNOSIS — M81.0 AGE-RELATED OSTEOPOROSIS WITHOUT CURRENT PATHOLOGICAL FRACTURE: Chronic | ICD-10-CM

## 2021-09-16 DIAGNOSIS — E78.2 MIXED HYPERLIPIDEMIA: Chronic | ICD-10-CM

## 2021-09-16 DIAGNOSIS — D51.8 VITAMIN B12 DEFICIENCY (DIETARY) ANEMIA: Chronic | ICD-10-CM

## 2021-09-16 DIAGNOSIS — D50.8 IRON DEFICIENCY ANEMIA SECONDARY TO INADEQUATE DIETARY IRON INTAKE: Chronic | ICD-10-CM

## 2021-09-16 DIAGNOSIS — R73.01 IMPAIRED FASTING GLUCOSE: Chronic | ICD-10-CM

## 2021-09-16 DIAGNOSIS — M85.88 OSTEOPENIA OF LUMBAR SPINE: Chronic | ICD-10-CM

## 2021-09-16 LAB
ALBUMIN SERPL-MCNC: 4.6 G/DL (ref 3.5–5)
ALBUMIN/GLOB SERPL: 1.5 G/DL (ref 1.1–1.8)
ALP SERPL-CCNC: 74 U/L (ref 38–126)
ALT SERPL W P-5'-P-CCNC: 31 U/L
ANION GAP SERPL CALCULATED.3IONS-SCNC: 9 MMOL/L (ref 5–15)
AST SERPL-CCNC: 40 U/L (ref 14–36)
BASOPHILS # BLD AUTO: 0.01 10*3/MM3 (ref 0–0.2)
BASOPHILS NFR BLD AUTO: 0.3 % (ref 0–1.5)
BILIRUB SERPL-MCNC: 0.6 MG/DL (ref 0.2–1.3)
BUN SERPL-MCNC: 9 MG/DL (ref 7–23)
BUN/CREAT SERPL: 10.6 (ref 7–25)
CALCIUM SPEC-SCNC: 9.5 MG/DL (ref 8.4–10.2)
CHLORIDE SERPL-SCNC: 95 MMOL/L (ref 101–112)
CO2 SERPL-SCNC: 23 MMOL/L (ref 22–30)
CREAT SERPL-MCNC: 0.85 MG/DL (ref 0.52–1.04)
DEPRECATED RDW RBC AUTO: 38.3 FL (ref 37–54)
EOSINOPHIL # BLD AUTO: 0.15 10*3/MM3 (ref 0–0.4)
EOSINOPHIL NFR BLD AUTO: 3.9 % (ref 0.3–6.2)
ERYTHROCYTE [DISTWIDTH] IN BLOOD BY AUTOMATED COUNT: 11.9 % (ref 12.3–15.4)
GFR SERPL CREATININE-BSD FRML MDRD: 63 ML/MIN/1.73 (ref 39–90)
GLOBULIN UR ELPH-MCNC: 3 GM/DL (ref 2.3–3.5)
GLUCOSE SERPL-MCNC: 97 MG/DL (ref 70–99)
HBA1C MFR BLD: 5.1 % (ref 4.8–5.6)
HCT VFR BLD AUTO: 34.5 % (ref 34–46.6)
HGB BLD-MCNC: 12.2 G/DL (ref 12–15.9)
LYMPHOCYTES # BLD AUTO: 1.49 10*3/MM3 (ref 0.7–3.1)
LYMPHOCYTES NFR BLD AUTO: 39.1 % (ref 19.6–45.3)
MCH RBC QN AUTO: 32.2 PG (ref 26.6–33)
MCHC RBC AUTO-ENTMCNC: 35.4 G/DL (ref 31.5–35.7)
MCV RBC AUTO: 91 FL (ref 79–97)
MONOCYTES # BLD AUTO: 0.53 10*3/MM3 (ref 0.1–0.9)
MONOCYTES NFR BLD AUTO: 13.9 % (ref 5–12)
NEUTROPHILS NFR BLD AUTO: 1.63 10*3/MM3 (ref 1.7–7)
NEUTROPHILS NFR BLD AUTO: 42.8 % (ref 42.7–76)
PLATELET # BLD AUTO: 179 10*3/MM3 (ref 140–450)
PMV BLD AUTO: 9.5 FL (ref 6–12)
POTASSIUM SERPL-SCNC: 4.5 MMOL/L (ref 3.4–5)
PROT SERPL-MCNC: 7.6 G/DL (ref 6.3–8.6)
RBC # BLD AUTO: 3.79 10*6/MM3 (ref 3.77–5.28)
SODIUM SERPL-SCNC: 127 MMOL/L (ref 137–145)
WBC # BLD AUTO: 3.81 10*3/MM3 (ref 3.4–10.8)

## 2021-09-16 PROCEDURE — 84466 ASSAY OF TRANSFERRIN: CPT | Performed by: INTERNAL MEDICINE

## 2021-09-16 PROCEDURE — 85025 COMPLETE CBC W/AUTO DIFF WBC: CPT | Performed by: INTERNAL MEDICINE

## 2021-09-16 PROCEDURE — 82607 VITAMIN B-12: CPT | Performed by: INTERNAL MEDICINE

## 2021-09-16 PROCEDURE — 83036 HEMOGLOBIN GLYCOSYLATED A1C: CPT | Performed by: INTERNAL MEDICINE

## 2021-09-16 PROCEDURE — 83721 ASSAY OF BLOOD LIPOPROTEIN: CPT | Performed by: INTERNAL MEDICINE

## 2021-09-16 PROCEDURE — 83540 ASSAY OF IRON: CPT | Performed by: INTERNAL MEDICINE

## 2021-09-16 PROCEDURE — 82728 ASSAY OF FERRITIN: CPT | Performed by: INTERNAL MEDICINE

## 2021-09-16 PROCEDURE — 82306 VITAMIN D 25 HYDROXY: CPT | Performed by: INTERNAL MEDICINE

## 2021-09-16 PROCEDURE — 36415 COLL VENOUS BLD VENIPUNCTURE: CPT | Performed by: INTERNAL MEDICINE

## 2021-09-16 PROCEDURE — 80053 COMPREHEN METABOLIC PANEL: CPT | Performed by: INTERNAL MEDICINE

## 2021-09-17 LAB
25(OH)D3 SERPL-MCNC: 67.3 NG/ML (ref 30–100)
ARTICHOKE IGE QN: 56 MG/DL (ref 0–100)
FERRITIN SERPL-MCNC: 701 NG/ML (ref 13–150)
IRON 24H UR-MRATE: 110 MCG/DL (ref 37–145)
IRON SATN MFR SERPL: 37 % (ref 20–50)
TIBC SERPL-MCNC: 297 MCG/DL (ref 298–536)
TRANSFERRIN SERPL-MCNC: 199 MG/DL (ref 200–360)
VIT B12 BLD-MCNC: 545 PG/ML (ref 211–946)

## 2021-09-28 ENCOUNTER — OFFICE VISIT (OUTPATIENT)
Dept: FAMILY MEDICINE CLINIC | Facility: CLINIC | Age: 86
End: 2021-09-28

## 2021-09-28 VITALS
WEIGHT: 115 LBS | BODY MASS INDEX: 23.18 KG/M2 | SYSTOLIC BLOOD PRESSURE: 135 MMHG | HEIGHT: 59 IN | DIASTOLIC BLOOD PRESSURE: 76 MMHG

## 2021-09-28 DIAGNOSIS — G89.29 CHRONIC BILATERAL LOW BACK PAIN WITHOUT SCIATICA: ICD-10-CM

## 2021-09-28 DIAGNOSIS — D50.8 IRON DEFICIENCY ANEMIA SECONDARY TO INADEQUATE DIETARY IRON INTAKE: Chronic | ICD-10-CM

## 2021-09-28 DIAGNOSIS — G47.9 SLEEP DISORDER: Chronic | ICD-10-CM

## 2021-09-28 DIAGNOSIS — D51.8 VITAMIN B12 DEFICIENCY (DIETARY) ANEMIA: Chronic | ICD-10-CM

## 2021-09-28 DIAGNOSIS — I71.20 THORACIC AORTIC ANEURYSM WITHOUT RUPTURE (HCC): Chronic | ICD-10-CM

## 2021-09-28 DIAGNOSIS — F33.41 RECURRENT MAJOR DEPRESSIVE DISORDER, IN PARTIAL REMISSION (HCC): Chronic | ICD-10-CM

## 2021-09-28 DIAGNOSIS — E78.2 MIXED HYPERLIPIDEMIA: Chronic | ICD-10-CM

## 2021-09-28 DIAGNOSIS — I10 ESSENTIAL HYPERTENSION: Primary | Chronic | ICD-10-CM

## 2021-09-28 DIAGNOSIS — E87.1 HYPONATREMIA: Chronic | ICD-10-CM

## 2021-09-28 DIAGNOSIS — M81.0 AGE-RELATED OSTEOPOROSIS WITHOUT CURRENT PATHOLOGICAL FRACTURE: Chronic | ICD-10-CM

## 2021-09-28 DIAGNOSIS — F41.1 GENERALIZED ANXIETY DISORDER: Chronic | ICD-10-CM

## 2021-09-28 DIAGNOSIS — R91.1 NODULE OF LEFT LUNG: Chronic | ICD-10-CM

## 2021-09-28 DIAGNOSIS — R73.01 IMPAIRED FASTING GLUCOSE: Chronic | ICD-10-CM

## 2021-09-28 DIAGNOSIS — M54.50 CHRONIC BILATERAL LOW BACK PAIN WITHOUT SCIATICA: ICD-10-CM

## 2021-09-28 DIAGNOSIS — E87.6 HYPOKALEMIA: Chronic | ICD-10-CM

## 2021-09-28 PROCEDURE — G2025 DIS SITE TELE SVCS RHC/FQHC: HCPCS | Performed by: INTERNAL MEDICINE

## 2021-09-28 RX ORDER — POTASSIUM CHLORIDE 750 MG/1
10 TABLET, FILM COATED, EXTENDED RELEASE ORAL DAILY
Qty: 90 TABLET | Refills: 3 | Status: SHIPPED | OUTPATIENT
Start: 2021-09-28 | End: 2021-11-19 | Stop reason: SDUPTHER

## 2021-09-28 RX ORDER — ATENOLOL 50 MG/1
50 TABLET ORAL DAILY
Qty: 90 TABLET | Refills: 3 | Status: SHIPPED | OUTPATIENT
Start: 2021-09-28 | End: 2021-11-19 | Stop reason: SDUPTHER

## 2021-09-28 RX ORDER — TRAMADOL HYDROCHLORIDE 50 MG/1
50 TABLET ORAL 2 TIMES DAILY PRN
Qty: 60 TABLET | Refills: 2 | Status: SHIPPED | OUTPATIENT
Start: 2021-09-28 | End: 2022-04-08 | Stop reason: SDUPTHER

## 2021-09-28 RX ORDER — OMEPRAZOLE 40 MG/1
40 CAPSULE, DELAYED RELEASE ORAL EVERY MORNING
Qty: 90 CAPSULE | Refills: 3 | Status: SHIPPED | OUTPATIENT
Start: 2021-09-28 | End: 2021-11-19 | Stop reason: SDUPTHER

## 2021-09-28 RX ORDER — FERROUS SULFATE 325(65) MG
1 TABLET ORAL EVERY OTHER DAY
Qty: 45 TABLET | Refills: 3 | Status: SHIPPED | OUTPATIENT
Start: 2021-09-28 | End: 2021-11-19 | Stop reason: SDUPTHER

## 2021-09-28 RX ORDER — DULOXETIN HYDROCHLORIDE 60 MG/1
60 CAPSULE, DELAYED RELEASE ORAL DAILY
Qty: 90 CAPSULE | Refills: 3 | Status: CANCELLED | OUTPATIENT
Start: 2021-09-28

## 2021-09-28 RX ORDER — ATORVASTATIN CALCIUM 10 MG/1
10 TABLET, FILM COATED ORAL DAILY
Qty: 90 TABLET | Refills: 3 | Status: SHIPPED | OUTPATIENT
Start: 2021-09-28 | End: 2021-11-19 | Stop reason: SDUPTHER

## 2021-09-28 RX ORDER — LISINOPRIL 10 MG/1
10 TABLET ORAL DAILY
Qty: 90 TABLET | Refills: 3 | Status: SHIPPED | OUTPATIENT
Start: 2021-09-28 | End: 2021-11-19 | Stop reason: SDUPTHER

## 2021-09-28 NOTE — PROGRESS NOTES
You have chosen to receive care through a telephone visit. Do you consent to use a telephone visit for your medical care today? Yes    Chief Complaint  Follow-up (6 month) and Med Refill (Tramadol arthritis)    Subjective          History of Present Illness     Brittney Rollins receives care who presents for  six-month follow up on hyperlipidemia, hypertension, GERD, osteoporosis, REY, and hypokalemia among other issues.       She has a mild thoracic aortic aneurysm which we follow approximately every 2 years.  Most recent CT angiogram 11/2019 revealed stable thoracic aortic aneurysm.   We are also monitoring an incidental finding of 8 mm left pulmonary nodule.   CT 03/2019 revealed no change in the lung nodule, strongly suggesting a benign process. She will be due to repeat CT in a couple of months (11/05/2021) to follow both issues.  She has been fully vaccinated for COVID with her 2nd dose of  Pfizer vaccine given 04/26/2021.  Booster will be available for patient to scheduled after 10/26/2021.  She can schedule the repeat imaging after she has received her COVID booster to help lower her risk of exposure.        She also will be due repeat DEXA 10/30/2021. We will plan on her scheduling this the same day of her repeat CT angiogram and after she has received her COVID booster  DEXA 10/2019 revealed persistent osteoporosis of the hip and osteopenia of the lumbar spine similar to prior DEXA.   She has been intolerant to the Fosamax and Prolia in the past and has decided to continue taking calcium and vitamin D supplements without prescription osteoporosis medication.    Calcium and vitamin D at goal.      She continues to take Xanax chronically for anxiety/sleep.   She also takes an two Advil most days adding Tramadol and Tylenol combination for more severe chronic lower lumbar back pain.  However, she has been out of the Tramadol for a while.   Denies significant side effects with the Tramadol including  "constipation.  She stopped the naproxen due to GI symptoms.  Earlier this year, I sent a prescription for Cymbalta to replace Lexapro.  She stopped the Cymbalta due to GI upset.      She continues taking Prilosec daily for GERD.  She was hospitalized in the past for gastritis and is careful to avoid foods, which exacerbate GERD/gastritis symptoms.        Labs reveal hyponatremia.  She is not on any medications, which lower sodium and does not add any salt to her foods.         The patient's relevant past medical, surgical, and social history was reviewed in Epic.   Lab results are reviewed with the patient today.  CBC unremarkable.  Fasting glucose 97. A1c 5.1.  LDL cholesterol 56.  AST very slightly elevated at 40.  AST at goal at 31.       Objective   Vital Signs:   /76   Ht 149.9 cm (59\")   Wt 52.2 kg (115 lb)   BMI 23.23 kg/m²       Physical Exam   Result Review :     CMP    CMP 1/29/21 9/16/21   Glucose 102 (A) 97   BUN 6 (A) 9   Creatinine 0.93 0.85   eGFR Non African Am 57 63   Sodium 126 (A) 127 (A)   Potassium 3.9 4.5   Chloride 93 (A) 95 (A)   Calcium 9.2 9.5   Albumin 4.40 4.60   Total Bilirubin 1.1 0.6   Alkaline Phosphatase 74 74   AST (SGOT) 36 40 (A)   ALT (SGPT) 22 31   (A) Abnormal value            CBC w/diff    CBC w/Diff 1/29/21 9/16/21   WBC 4.05 3.81   RBC 3.85 3.79   Hemoglobin 12.1 12.2   Hematocrit 34.9 34.5   MCV 90.6 91.0   MCH 31.4 32.2   MCHC 34.7 35.4   RDW 11.9 (A) 11.9 (A)   Platelets 181 179   Neutrophil Rel % 46.7 42.8   Lymphocyte Rel % 38.5 39.1   Monocyte Rel % 10.1 13.9 (A)   Eosinophil Rel % 4.2 3.9   Basophil Rel % 0.5 0.3   (A) Abnormal value            Lipid Panel    Lipid Panel 1/29/21 9/16/21   Total Cholesterol 152    Triglycerides 131    HDL Cholesterol 54    VLDL Cholesterol 23    LDL Cholesterol  75 56   LDL/HDL Ratio 1.33            TSH    TSH 1/29/21   TSH 2.260           A1C Last 3 Results    HGBA1C Last 3 Results 1/29/21 9/16/21   Hemoglobin A1C 5.55 5.10 "           Data reviewed: Radiologic studies DEXA 10/2019 and CT angiogram 11/2019          Assessment and Plan    Diagnoses and all orders for this visit:    1. Essential hypertension (Primary)  -     CBC Auto Differential; Future  -     Comprehensive Metabolic Panel; Future    2. Chronic bilateral low back pain without sciatica  -     traMADol (ULTRAM) 50 MG tablet; Take 1 tablet by mouth 2 (Two) Times a Day As Needed for Severe Pain . Take Tylenol 500 mg with each dose  Dispense: 60 tablet; Refill: 2    3. Mixed hyperlipidemia  -     Lipid Panel; Future  -     TSH; Future    4. Thoracic aortic aneurysm without rupture (HCC)  -     CT Angiogram Chest; Future    5. Impaired fasting glucose  -     Comprehensive Metabolic Panel; Future    6. Hypokalemia  -     Comprehensive Metabolic Panel; Future    7. Hyponatremia  -     Comprehensive Metabolic Panel; Future    8. Vitamin B12 deficiency (dietary) anemia  -     Folate; Future  -     Vitamin B12; Future    9. Iron deficiency anemia secondary to inadequate dietary iron intake  -     Ferritin; Future  -     Iron Profile; Future    10. Recurrent major depressive disorder, in partial remission (CMS/HCC)    11. Generalized anxiety disorder    12. Sleep disorder    13. Age-related osteoporosis without current pathological fracture  -     DEXA Bone Density Axial  -     Vitamin D 25 Hydroxy; Future    14. Nodule of left lung  -     CT Angiogram Chest; Future    Other orders  -     atenolol (TENORMIN) 50 MG tablet; Take 1 tablet by mouth Daily. for blood pressure and heart  Dispense: 90 tablet; Refill: 3  -     atorvastatin (LIPITOR) 10 MG tablet; Take 1 tablet by mouth Daily. FOR CHOLESTEROL  Dispense: 90 tablet; Refill: 3  -     Cancel: DULoxetine (CYMBALTA) 60 MG capsule; Take 1 capsule by mouth Daily. For nerves and chronic pain  Dispense: 90 capsule; Refill: 3  -     ferrous sulfate (FeroSul) 325 (65 FE) MG tablet; Take 1 tablet by mouth Every Other Day.  Dispense: 45  tablet; Refill: 3  -     lisinopril (PRINIVIL,ZESTRIL) 10 MG tablet; Take 1 tablet by mouth Daily.  Dispense: 90 tablet; Refill: 3  -     omeprazole (priLOSEC) 40 MG capsule; Take 1 capsule by mouth Every Morning.  Dispense: 90 capsule; Refill: 3  -     potassium chloride 10 MEQ CR tablet; Take 1 tablet by mouth Daily.  Dispense: 90 tablet; Refill: 3           I spent 31 minutes caring for Brittney on this date of service. This time includes time spent by me in the following activities:preparing for the visit, reviewing tests, obtaining and/or reviewing a separately obtained history, counseling and educating the patient/family/caregiver, ordering medications, tests, or procedures, documenting information in the medical record and independently interpreting results and communicating that information with the patient/family/caregiver     I placed orders for patient to schedule repeat CT angiogram of the chest to follow thoracic aortic aneurysm and incidental finding of 8 mm left pulmonary nodule as well as repeat DEXA.     Refill prescription sent for Tramadol to take in combination with Tylenol for pain uncontrolled by Tylenol alone or occasional use of OTC ibuprofen.  I encouraged her to try to manage her daily lumbar pain with the Advil and Tylenol, taking the Tramadol for days she is experiencing more severe pain.    Patient understands the risks associated with this controlled medication, including tolerance and addiction.  She also agrees to only obtain this medication from me, and not from a another provider, unless that provider is covering for me in my absence.  Continue Xanax for REY.  We will refill when due.     Continue oral iron and B-12.     She can add salt to foods moderately to help with the hyponatremia.  We will continue to monitor. She is not on any medication that should significantly contribute to the hyponatremia, perhaps the lisinopril    Continue oral potassium and lisinopril.  Hypokalemia at  goal.       Continue the vascular risk factor modifications, including Lipitor, lisinopril and atenolol.  Cholesterol at goal.  Blood pressure at goal.  Continue to monitor BP/heart rate and notify me if not consistently at goal.        Return in six months for follow up with fasting labs one week prior.        Scribed for Dr. Dillon by Sophie Méndez [facility].     Follow Up   Return in about 6 months (around 3/28/2022) for Follow up in six months with labs one week prior..  Patient was given instructions and counseling regarding her condition or for health maintenance advice. Please see specific information pulled into the AVS if appropriate.

## 2021-10-27 ENCOUNTER — OFFICE VISIT (OUTPATIENT)
Dept: FAMILY MEDICINE CLINIC | Facility: CLINIC | Age: 86
End: 2021-10-27

## 2021-10-27 VITALS — HEIGHT: 59 IN | BODY MASS INDEX: 23.18 KG/M2 | WEIGHT: 115 LBS

## 2021-10-27 DIAGNOSIS — Z00.00 MEDICARE ANNUAL WELLNESS VISIT, SUBSEQUENT: Primary | ICD-10-CM

## 2021-10-27 DIAGNOSIS — Z23 NEED FOR IMMUNIZATION AGAINST INFLUENZA: ICD-10-CM

## 2021-10-27 DIAGNOSIS — Z23 NEED FOR COVID-19 VACCINE: ICD-10-CM

## 2021-10-27 PROCEDURE — G0439 PPPS, SUBSEQ VISIT: HCPCS | Performed by: INTERNAL MEDICINE

## 2021-10-27 PROCEDURE — 1126F AMNT PAIN NOTED NONE PRSNT: CPT | Performed by: INTERNAL MEDICINE

## 2021-10-27 PROCEDURE — 1159F MED LIST DOCD IN RCRD: CPT | Performed by: INTERNAL MEDICINE

## 2021-10-27 NOTE — PROGRESS NOTES
You have chosen to receive care through a telephone visit. Do you consent to use a telephone visit for your medical care today? Yes    The ABCs of the Annual Wellness Visit  Subsequent Medicare Wellness Visit    Chief Complaint   Patient presents with   • Medicare Wellness-subsequent      Subjective    History of Present Illness:  Brittney Rollins is a 86 y.o. female who presents for a Subsequent Medicare Wellness Visit.    The following portions of the patient's history were reviewed and   updated as appropriate:   She  has a past medical history of Anemia, Aneurysm, thoracic aortic (HCC), Benign essential tremor (1/15/2019), Chronic low back pain, Disorder of cardiovascular system, Dysthymia, Essential hypertension, Gastritis, Generalized anxiety disorder, H/O bone density study (06/12/2015), H/O mammogram (08/2012), Hyperlipidemia, Hypokalemia, Hyponatremia (9/28/2021), Impaired fasting glucose, Iron deficiency anemia, Irritable bowel syndrome, Osteopenia, Osteoporosis, Peptic ulcer, Recurrent major depressive disorder, in partial remission (HCC) (2/5/2021), Sleep disorder, and Vitamin B12 deficiency (dietary) anemia (1/15/2019).  She does not have any pertinent problems on file.  She  has a past surgical history that includes endoscopy and colonoscopy (09/2011); Injection of Medication (08/11/2015); and Breast biopsy (Left).  Her family history is not on file.  She  reports that she has never smoked. She has never used smokeless tobacco. She reports that she does not drink alcohol and does not use drugs.  Current Outpatient Medications   Medication Sig Dispense Refill   • ALPRAZolam (XANAX) 0.5 MG tablet TAKE 1/2 TO 1 TABLET BY MOUTH TWO TIMES A DAY (Patient taking differently: TAKE 1/2 TO 1 TABLET BY MOUTH TWO TIMES A DAY prn) 60 tablet 2   • atenolol (TENORMIN) 50 MG tablet Take 1 tablet by mouth Daily. for blood pressure and heart 90 tablet 3   • atorvastatin (LIPITOR) 10 MG tablet Take 1 tablet by mouth  Daily. FOR CHOLESTEROL 90 tablet 3   • Biotin 86092 MCG tablet dispersible Take 1 tablet by mouth Daily.     • Calcium Carb-Ergocalciferol 500-200 MG-UNIT tablet Take 1 tablet by mouth Daily.     • cholecalciferol (VITAMIN D3) 1000 UNITS tablet Take 1,000 Units by mouth Daily.     • DULoxetine (CYMBALTA) 60 MG capsule Take 1 capsule by mouth Daily. For nerves and chronic pain 30 capsule 11   • ferrous sulfate (FeroSul) 325 (65 FE) MG tablet Take 1 tablet by mouth Every Other Day. 45 tablet 3   • lisinopril (PRINIVIL,ZESTRIL) 10 MG tablet Take 1 tablet by mouth Daily. 90 tablet 3   • meclizine (ANTIVERT) 25 MG tablet TAKE 1 TABLET BY MOUTH FOUR TIMES A DAY AS NEEDED 30 tablet 2   • Omega-3 Fatty Acids (FISH OIL CONCENTRATE) 1000 MG capsule Take 1 capsule by mouth Daily.     • omeprazole (priLOSEC) 40 MG capsule Take 1 capsule by mouth Every Morning. 90 capsule 3   • potassium chloride 10 MEQ CR tablet Take 1 tablet by mouth Daily. 90 tablet 3   • traMADol (ULTRAM) 50 MG tablet Take 1 tablet by mouth 2 (Two) Times a Day As Needed for Severe Pain . Take Tylenol 500 mg with each dose 60 tablet 2   • vitamin B-12 (CYANOCOBALAMIN) 1000 MCG tablet Take 1,000 mcg by mouth Daily.       No current facility-administered medications for this visit.     Current Outpatient Medications on File Prior to Visit   Medication Sig   • ALPRAZolam (XANAX) 0.5 MG tablet TAKE 1/2 TO 1 TABLET BY MOUTH TWO TIMES A DAY (Patient taking differently: TAKE 1/2 TO 1 TABLET BY MOUTH TWO TIMES A DAY prn)   • atenolol (TENORMIN) 50 MG tablet Take 1 tablet by mouth Daily. for blood pressure and heart   • atorvastatin (LIPITOR) 10 MG tablet Take 1 tablet by mouth Daily. FOR CHOLESTEROL   • Biotin 02251 MCG tablet dispersible Take 1 tablet by mouth Daily.   • Calcium Carb-Ergocalciferol 500-200 MG-UNIT tablet Take 1 tablet by mouth Daily.   • cholecalciferol (VITAMIN D3) 1000 UNITS tablet Take 1,000 Units by mouth Daily.   • DULoxetine (CYMBALTA) 60 MG  capsule Take 1 capsule by mouth Daily. For nerves and chronic pain   • ferrous sulfate (FeroSul) 325 (65 FE) MG tablet Take 1 tablet by mouth Every Other Day.   • lisinopril (PRINIVIL,ZESTRIL) 10 MG tablet Take 1 tablet by mouth Daily.   • meclizine (ANTIVERT) 25 MG tablet TAKE 1 TABLET BY MOUTH FOUR TIMES A DAY AS NEEDED   • Omega-3 Fatty Acids (FISH OIL CONCENTRATE) 1000 MG capsule Take 1 capsule by mouth Daily.   • omeprazole (priLOSEC) 40 MG capsule Take 1 capsule by mouth Every Morning.   • potassium chloride 10 MEQ CR tablet Take 1 tablet by mouth Daily.   • traMADol (ULTRAM) 50 MG tablet Take 1 tablet by mouth 2 (Two) Times a Day As Needed for Severe Pain . Take Tylenol 500 mg with each dose   • vitamin B-12 (CYANOCOBALAMIN) 1000 MCG tablet Take 1,000 mcg by mouth Daily.     No current facility-administered medications on file prior to visit.     She is allergic to fosamax [alendronate], nsaids, phenergan [promethazine hcl], sulfa antibiotics, paxil [paroxetine hcl], and prolia [denosumab]..    Compared to one year ago, the patient feels her physical   health is worse.    Compared to one year ago, the patient feels her mental   health is the same.    Recent Hospitalizations:  She was not admitted to the hospital during the last year.       Current Medical Providers:  Patient Care Team:  Pollo Dillon MD as PCP - General (Internal Medicine)    Outpatient Medications Prior to Visit   Medication Sig Dispense Refill   • ALPRAZolam (XANAX) 0.5 MG tablet TAKE 1/2 TO 1 TABLET BY MOUTH TWO TIMES A DAY (Patient taking differently: TAKE 1/2 TO 1 TABLET BY MOUTH TWO TIMES A DAY prn) 60 tablet 2   • atenolol (TENORMIN) 50 MG tablet Take 1 tablet by mouth Daily. for blood pressure and heart 90 tablet 3   • atorvastatin (LIPITOR) 10 MG tablet Take 1 tablet by mouth Daily. FOR CHOLESTEROL 90 tablet 3   • Biotin 32693 MCG tablet dispersible Take 1 tablet by mouth Daily.     • Calcium Carb-Ergocalciferol 500-200 MG-UNIT  tablet Take 1 tablet by mouth Daily.     • cholecalciferol (VITAMIN D3) 1000 UNITS tablet Take 1,000 Units by mouth Daily.     • DULoxetine (CYMBALTA) 60 MG capsule Take 1 capsule by mouth Daily. For nerves and chronic pain 30 capsule 11   • ferrous sulfate (FeroSul) 325 (65 FE) MG tablet Take 1 tablet by mouth Every Other Day. 45 tablet 3   • lisinopril (PRINIVIL,ZESTRIL) 10 MG tablet Take 1 tablet by mouth Daily. 90 tablet 3   • meclizine (ANTIVERT) 25 MG tablet TAKE 1 TABLET BY MOUTH FOUR TIMES A DAY AS NEEDED 30 tablet 2   • Omega-3 Fatty Acids (FISH OIL CONCENTRATE) 1000 MG capsule Take 1 capsule by mouth Daily.     • omeprazole (priLOSEC) 40 MG capsule Take 1 capsule by mouth Every Morning. 90 capsule 3   • potassium chloride 10 MEQ CR tablet Take 1 tablet by mouth Daily. 90 tablet 3   • traMADol (ULTRAM) 50 MG tablet Take 1 tablet by mouth 2 (Two) Times a Day As Needed for Severe Pain . Take Tylenol 500 mg with each dose 60 tablet 2   • vitamin B-12 (CYANOCOBALAMIN) 1000 MCG tablet Take 1,000 mcg by mouth Daily.       No facility-administered medications prior to visit.       Opioid medication/s are on active medication list.  and I have evaluated her active treatment plan and pain score trends (see table).  Vitals:    10/27/21 1434   PainSc:   5   PainLoc: Back     I have reviewed the chart for potential of high risk medication and harmful drug interactions in the elderly.            Aspirin is not on active medication list.  Aspirin use is not indicated based on review of current medical condition/s. Risk of harm outweighs potential benefits.  .    Patient Active Problem List   Diagnosis   • Sleep disorder   • Peptic ulcer   • Age-related osteoporosis without current pathological fracture   • Osteopenia of lumbar spine   • Irritable bowel syndrome   • Iron deficiency anemia   • Impaired fasting glucose   • Hypokalemia   • Hyperlipidemia   • H/O mammogram   • H/O bone density study   • Generalized anxiety  "disorder   • Gastritis   • Essential hypertension   • Disorder of cardiovascular system   • Chronic low back pain   • Aneurysm, thoracic aortic (CMS/HCC) -4.4 cm   • Anemia   • Nodule of left lung   • Vitamin B12 deficiency (dietary) anemia   • Benign essential tremor   • Recurrent major depressive disorder, in partial remission (HCC)   • Hyponatremia     Advance Care Planning  Advance Directive is not on file.  ACP discussion was held with the patient during this visit. Patient does not have an advance directive, information provided.          Objective    Vitals:    10/27/21 1434   Weight: 52.2 kg (115 lb)   Height: 149.9 cm (59\")   PainSc:   5   PainLoc: Back     BMI Readings from Last 1 Encounters:   10/27/21 23.23 kg/m²   BMI is within normal parameters. No follow-up required.    Does the patient have evidence of cognitive impairment? No    Physical Exam  Lab Results   Component Value Date    LDL 56 09/16/2021    HGBA1C 5.10 09/16/2021            HEALTH RISK ASSESSMENT    Smoking Status:  Social History     Tobacco Use   Smoking Status Never Smoker   Smokeless Tobacco Never Used     Alcohol Consumption:  Social History     Substance and Sexual Activity   Alcohol Use No     Fall Risk Screen:    STEADI Fall Risk Assessment was completed, and patient is at MODERATE risk for falls. Assessment completed on:10/27/2021    Depression Screening:  PHQ-2/PHQ-9 Depression Screening 10/27/2021   Little interest or pleasure in doing things 0   Feeling down, depressed, or hopeless 1   Trouble falling or staying asleep, or sleeping too much -   Feeling tired or having little energy -   Poor appetite or overeating -   Feeling bad about yourself - or that you are a failure or have let yourself or your family down -   Trouble concentrating on things, such as reading the newspaper or watching television -   Moving or speaking so slowly that other people could have noticed. Or the opposite - being so fidgety or restless that you " have been moving around a lot more than usual -   Thoughts that you would be better off dead, or of hurting yourself in some way -   Total Score 1   If you checked off any problems, how difficult have these problems made it for you to do your work, take care of things at home, or get along with other people? -       Health Habits and Functional and Cognitive Screening:  Functional & Cognitive Status 10/27/2021   Do you have difficulty preparing food and eating? No   Do you have difficulty bathing yourself, getting dressed or grooming yourself? No   Do you have difficulty using the toilet? No   Do you have difficulty moving around from place to place? Yes   Do you have trouble with steps or getting out of a bed or a chair? Yes   Current Diet Limited Junk Food   Dental Exam Not up to date   Eye Exam Not up to date   Exercise (times per week) 1 times per week   Current Exercises Include House Cleaning   Current Exercise Activities Include -   Do you need help using the phone?  No   Are you deaf or do you have serious difficulty hearing?  No   Do you need help with transportation? Yes   Do you need help shopping? Yes   Do you need help preparing meals?  No   Do you need help with housework?  No   Do you need help with laundry? No   Do you need help taking your medications? No   Do you need help managing money? No   Do you ever drive or ride in a car without wearing a seat belt? No   Have you felt unusual stress, anger or loneliness in the last month? No   Who do you live with? Alone   If you need help, do you have trouble finding someone available to you? No   Have you been bothered in the last four weeks by sexual problems? No   Do you have difficulty concentrating, remembering or making decisions? Yes       Age-appropriate Screening Schedule:  Refer to the list below for future screening recommendations based on patient's age, sex and/or medical conditions. Orders for these recommended tests are listed in the plan  section. The patient has been provided with a written plan.    Health Maintenance   Topic Date Due   • TDAP/TD VACCINES (1 - Tdap) Never done   • ZOSTER VACCINE (1 of 2) Never done   • INFLUENZA VACCINE  08/01/2021   • DXA SCAN  10/30/2021   • LIPID PANEL  09/16/2022              Assessment/Plan   CMS Preventative Services Quick Reference  Risk Factors Identified During Encounter  Chronic Pain   Immunizations Discussed/Encouraged (specific Immunizations; Influenza and COVID19  The above risks/problems have been discussed with the patient.  The patient is encouraged to get her COVID booster ASAP, and also her annual influenza vaccination.  She voices agreement.  Follow up actions/plans if indicated are seen below in the Assessment/Plan Section.  Pertinent information has been shared with the patient in the After Visit Summary.    Diagnoses and all orders for this visit:    1. Medicare annual wellness visit, subsequent (Primary)    2. Need for COVID-19 vaccine    3. Need for immunization against influenza        Follow Up:   Return in about 1 year (around 10/27/2022).     An After Visit Summary and PPPS were made available to the patient.

## 2021-10-27 NOTE — PATIENT INSTRUCTIONS
Medicare Wellness  Personal Prevention Plan of Service     Date of Office Visit:  10/27/2021  Encounter Provider:  Pollo Dillon MD  Place of Service:  [facility] [place]  Patient Name: Brittney Rollins  :  1934    As part of the Medicare Wellness portion of your visit today, we are providing you with this personalized preventive plan of services (PPPS). This plan is based upon recommendations of the [place] Preventive Services Task Force (USPSTF) and the Advisory Committee on Immunization Practices (ACIP).    This lists the preventive care services that should be considered, and provides dates of when you are due. Items listed as completed are up-to-date and do not require any further intervention.    Health Maintenance   Topic Date Due   • TDAP/TD VACCINES (1 - Tdap) Never done   • ZOSTER VACCINE (1 of 2) Never done   • INFLUENZA VACCINE  2021   • ANNUAL WELLNESS VISIT  2021   • COVID-19 Vaccine (3 - Pfizer booster) 10/26/2021   • DXA SCAN  10/30/2021   • LIPID PANEL  2022   • Pneumococcal Vaccine 65+  Completed       No orders of the defined types were placed in this encounter.      Return in about 1 year (around 10/27/2022).

## 2021-11-19 RX ORDER — OMEPRAZOLE 40 MG/1
40 CAPSULE, DELAYED RELEASE ORAL EVERY MORNING
Qty: 90 CAPSULE | Refills: 3 | Status: SHIPPED | OUTPATIENT
Start: 2021-11-19 | End: 2022-10-11

## 2021-11-19 RX ORDER — ATENOLOL 50 MG/1
50 TABLET ORAL DAILY
Qty: 90 TABLET | Refills: 3 | Status: SHIPPED | OUTPATIENT
Start: 2021-11-19 | End: 2022-10-20

## 2021-11-19 RX ORDER — POTASSIUM CHLORIDE 750 MG/1
10 TABLET, FILM COATED, EXTENDED RELEASE ORAL DAILY
Qty: 90 TABLET | Refills: 3 | Status: SHIPPED | OUTPATIENT
Start: 2021-11-19 | End: 2022-10-11

## 2021-11-19 RX ORDER — ATORVASTATIN CALCIUM 10 MG/1
10 TABLET, FILM COATED ORAL DAILY
Qty: 90 TABLET | Refills: 3 | Status: SHIPPED | OUTPATIENT
Start: 2021-11-19 | End: 2022-10-11

## 2021-11-19 RX ORDER — LISINOPRIL 10 MG/1
10 TABLET ORAL DAILY
Qty: 90 TABLET | Refills: 3 | Status: SHIPPED | OUTPATIENT
Start: 2021-11-19 | End: 2023-02-13

## 2021-11-19 RX ORDER — FERROUS SULFATE 325(65) MG
1 TABLET ORAL EVERY OTHER DAY
Qty: 45 TABLET | Refills: 3 | Status: SHIPPED | OUTPATIENT
Start: 2021-11-19 | End: 2023-02-13

## 2021-11-22 DIAGNOSIS — F41.1 GENERALIZED ANXIETY DISORDER: ICD-10-CM

## 2021-11-22 RX ORDER — ALPRAZOLAM 0.5 MG/1
TABLET ORAL
Qty: 60 TABLET | Refills: 2 | Status: SHIPPED | OUTPATIENT
Start: 2021-11-22 | End: 2022-04-15 | Stop reason: SDUPTHER

## 2022-02-07 DIAGNOSIS — G89.29 CHRONIC BILATERAL LOW BACK PAIN WITHOUT SCIATICA: ICD-10-CM

## 2022-02-07 DIAGNOSIS — M54.50 CHRONIC BILATERAL LOW BACK PAIN WITHOUT SCIATICA: ICD-10-CM

## 2022-02-07 RX ORDER — TRAMADOL HYDROCHLORIDE 50 MG/1
TABLET ORAL
Qty: 60 TABLET | Refills: 2 | OUTPATIENT
Start: 2022-02-07

## 2022-02-16 DIAGNOSIS — F41.1 GENERALIZED ANXIETY DISORDER: ICD-10-CM

## 2022-02-16 RX ORDER — ALPRAZOLAM 0.5 MG/1
TABLET ORAL
Qty: 60 TABLET | Refills: 2 | OUTPATIENT
Start: 2022-02-16

## 2022-04-05 DIAGNOSIS — M54.50 CHRONIC BILATERAL LOW BACK PAIN WITHOUT SCIATICA: ICD-10-CM

## 2022-04-05 DIAGNOSIS — G89.29 CHRONIC BILATERAL LOW BACK PAIN WITHOUT SCIATICA: ICD-10-CM

## 2022-04-05 RX ORDER — TRAMADOL HYDROCHLORIDE 50 MG/1
TABLET ORAL
Qty: 60 TABLET | Refills: 2 | OUTPATIENT
Start: 2022-04-05

## 2022-04-08 DIAGNOSIS — G89.29 CHRONIC BILATERAL LOW BACK PAIN WITHOUT SCIATICA: ICD-10-CM

## 2022-04-08 DIAGNOSIS — M54.50 CHRONIC BILATERAL LOW BACK PAIN WITHOUT SCIATICA: ICD-10-CM

## 2022-04-08 RX ORDER — TRAMADOL HYDROCHLORIDE 50 MG/1
50 TABLET ORAL 2 TIMES DAILY PRN
Qty: 30 TABLET | Refills: 0 | Status: SHIPPED | OUTPATIENT
Start: 2022-04-08 | End: 2022-04-27 | Stop reason: SDUPTHER

## 2022-04-13 DIAGNOSIS — F41.1 GENERALIZED ANXIETY DISORDER: ICD-10-CM

## 2022-04-13 RX ORDER — ALPRAZOLAM 0.5 MG/1
TABLET ORAL
Qty: 60 TABLET | Refills: 2 | OUTPATIENT
Start: 2022-04-13

## 2022-04-15 ENCOUNTER — LAB (OUTPATIENT)
Dept: LAB | Facility: OTHER | Age: 87
End: 2022-04-15

## 2022-04-15 DIAGNOSIS — E87.6 HYPOKALEMIA: Chronic | ICD-10-CM

## 2022-04-15 DIAGNOSIS — I10 ESSENTIAL HYPERTENSION: Primary | ICD-10-CM

## 2022-04-15 DIAGNOSIS — M85.88 OSTEOPENIA OF LUMBAR SPINE: ICD-10-CM

## 2022-04-15 DIAGNOSIS — D50.8 IRON DEFICIENCY ANEMIA SECONDARY TO INADEQUATE DIETARY IRON INTAKE: ICD-10-CM

## 2022-04-15 DIAGNOSIS — D50.8 IRON DEFICIENCY ANEMIA SECONDARY TO INADEQUATE DIETARY IRON INTAKE: Chronic | ICD-10-CM

## 2022-04-15 DIAGNOSIS — R73.01 IMPAIRED FASTING GLUCOSE: ICD-10-CM

## 2022-04-15 DIAGNOSIS — D51.8 VITAMIN B12 DEFICIENCY (DIETARY) ANEMIA: ICD-10-CM

## 2022-04-15 DIAGNOSIS — E87.1 HYPONATREMIA: Chronic | ICD-10-CM

## 2022-04-15 DIAGNOSIS — D51.8 VITAMIN B12 DEFICIENCY (DIETARY) ANEMIA: Chronic | ICD-10-CM

## 2022-04-15 DIAGNOSIS — E78.2 MIXED HYPERLIPIDEMIA: ICD-10-CM

## 2022-04-15 DIAGNOSIS — F41.1 GENERALIZED ANXIETY DISORDER: ICD-10-CM

## 2022-04-15 DIAGNOSIS — M81.0 AGE-RELATED OSTEOPOROSIS WITHOUT CURRENT PATHOLOGICAL FRACTURE: Chronic | ICD-10-CM

## 2022-04-15 DIAGNOSIS — R73.01 IMPAIRED FASTING GLUCOSE: Chronic | ICD-10-CM

## 2022-04-15 DIAGNOSIS — I10 ESSENTIAL HYPERTENSION: ICD-10-CM

## 2022-04-15 LAB
ALBUMIN SERPL-MCNC: 4.3 G/DL (ref 3.5–5)
ALBUMIN/GLOB SERPL: 1.4 G/DL (ref 1.1–1.8)
ALP SERPL-CCNC: 69 U/L (ref 38–126)
ALT SERPL W P-5'-P-CCNC: 33 U/L
ANION GAP SERPL CALCULATED.3IONS-SCNC: 7 MMOL/L (ref 5–15)
AST SERPL-CCNC: 42 U/L (ref 14–36)
BASOPHILS # BLD AUTO: 0.01 10*3/MM3 (ref 0–0.2)
BASOPHILS NFR BLD AUTO: 0.2 % (ref 0–1.5)
BILIRUB SERPL-MCNC: 1 MG/DL (ref 0.2–1.3)
BUN SERPL-MCNC: 11 MG/DL (ref 7–23)
BUN/CREAT SERPL: 12.4 (ref 7–25)
CALCIUM SPEC-SCNC: 9.5 MG/DL (ref 8.4–10.2)
CHLORIDE SERPL-SCNC: 97 MMOL/L (ref 101–112)
CHOLEST SERPL-MCNC: 152 MG/DL (ref 150–200)
CO2 SERPL-SCNC: 25 MMOL/L (ref 22–30)
CREAT SERPL-MCNC: 0.89 MG/DL (ref 0.52–1.04)
DEPRECATED RDW RBC AUTO: 40.6 FL (ref 37–54)
EGFRCR SERPLBLD CKD-EPI 2021: 62.8 ML/MIN/1.73
EOSINOPHIL # BLD AUTO: 0.12 10*3/MM3 (ref 0–0.4)
EOSINOPHIL NFR BLD AUTO: 2.5 % (ref 0.3–6.2)
ERYTHROCYTE [DISTWIDTH] IN BLOOD BY AUTOMATED COUNT: 12.3 % (ref 12.3–15.4)
GLOBULIN UR ELPH-MCNC: 3.1 GM/DL (ref 2.3–3.5)
GLUCOSE SERPL-MCNC: 103 MG/DL (ref 70–99)
HCT VFR BLD AUTO: 36.3 % (ref 34–46.6)
HDLC SERPL-MCNC: 57 MG/DL (ref 40–59)
HGB BLD-MCNC: 12.6 G/DL (ref 12–15.9)
LDLC SERPL CALC-MCNC: 69 MG/DL
LDLC/HDLC SERPL: 1.12 {RATIO} (ref 0–3.22)
LYMPHOCYTES # BLD AUTO: 1.23 10*3/MM3 (ref 0.7–3.1)
LYMPHOCYTES NFR BLD AUTO: 25.7 % (ref 19.6–45.3)
MCH RBC QN AUTO: 32.1 PG (ref 26.6–33)
MCHC RBC AUTO-ENTMCNC: 34.7 G/DL (ref 31.5–35.7)
MCV RBC AUTO: 92.6 FL (ref 79–97)
MONOCYTES # BLD AUTO: 0.48 10*3/MM3 (ref 0.1–0.9)
MONOCYTES NFR BLD AUTO: 10 % (ref 5–12)
NEUTROPHILS NFR BLD AUTO: 2.94 10*3/MM3 (ref 1.7–7)
NEUTROPHILS NFR BLD AUTO: 61.6 % (ref 42.7–76)
PLATELET # BLD AUTO: 172 10*3/MM3 (ref 140–450)
PMV BLD AUTO: 9.4 FL (ref 6–12)
POTASSIUM SERPL-SCNC: 4.1 MMOL/L (ref 3.4–5)
PROT SERPL-MCNC: 7.4 G/DL (ref 6.3–8.6)
RBC # BLD AUTO: 3.92 10*6/MM3 (ref 3.77–5.28)
SODIUM SERPL-SCNC: 129 MMOL/L (ref 137–145)
TRIGL SERPL-MCNC: 155 MG/DL
VLDLC SERPL-MCNC: 26 MG/DL (ref 5–40)
WBC NRBC COR # BLD: 4.78 10*3/MM3 (ref 3.4–10.8)

## 2022-04-15 PROCEDURE — 85025 COMPLETE CBC W/AUTO DIFF WBC: CPT | Performed by: INTERNAL MEDICINE

## 2022-04-15 PROCEDURE — 84466 ASSAY OF TRANSFERRIN: CPT | Performed by: INTERNAL MEDICINE

## 2022-04-15 PROCEDURE — 82306 VITAMIN D 25 HYDROXY: CPT | Performed by: INTERNAL MEDICINE

## 2022-04-15 PROCEDURE — 84443 ASSAY THYROID STIM HORMONE: CPT | Performed by: INTERNAL MEDICINE

## 2022-04-15 PROCEDURE — 80061 LIPID PANEL: CPT | Performed by: INTERNAL MEDICINE

## 2022-04-15 PROCEDURE — 82728 ASSAY OF FERRITIN: CPT | Performed by: INTERNAL MEDICINE

## 2022-04-15 PROCEDURE — 82746 ASSAY OF FOLIC ACID SERUM: CPT | Performed by: INTERNAL MEDICINE

## 2022-04-15 PROCEDURE — 83036 HEMOGLOBIN GLYCOSYLATED A1C: CPT | Performed by: INTERNAL MEDICINE

## 2022-04-15 PROCEDURE — 80053 COMPREHEN METABOLIC PANEL: CPT | Performed by: INTERNAL MEDICINE

## 2022-04-15 PROCEDURE — 36415 COLL VENOUS BLD VENIPUNCTURE: CPT | Performed by: INTERNAL MEDICINE

## 2022-04-15 PROCEDURE — 83540 ASSAY OF IRON: CPT | Performed by: INTERNAL MEDICINE

## 2022-04-15 PROCEDURE — 82607 VITAMIN B-12: CPT | Performed by: INTERNAL MEDICINE

## 2022-04-15 RX ORDER — ALPRAZOLAM 0.5 MG/1
TABLET ORAL
Qty: 30 TABLET | Refills: 0 | Status: SHIPPED | OUTPATIENT
Start: 2022-04-15 | End: 2022-04-27 | Stop reason: SDUPTHER

## 2022-04-16 LAB
25(OH)D3 SERPL-MCNC: 47.6 NG/ML (ref 30–100)
FERRITIN SERPL-MCNC: 762 NG/ML (ref 13–150)
FOLATE SERPL-MCNC: 7.42 NG/ML (ref 4.78–24.2)
HBA1C MFR BLD: 4.9 % (ref 4.8–5.6)
IRON 24H UR-MRATE: 94 MCG/DL (ref 37–145)
IRON SATN MFR SERPL: 30 % (ref 20–50)
TIBC SERPL-MCNC: 316 MCG/DL (ref 298–536)
TRANSFERRIN SERPL-MCNC: 212 MG/DL (ref 200–360)
TSH SERPL DL<=0.05 MIU/L-ACNC: 5.36 UIU/ML (ref 0.27–4.2)
VIT B12 BLD-MCNC: 440 PG/ML (ref 211–946)

## 2022-04-27 ENCOUNTER — OFFICE VISIT (OUTPATIENT)
Dept: FAMILY MEDICINE CLINIC | Facility: CLINIC | Age: 87
End: 2022-04-27

## 2022-04-27 VITALS
SYSTOLIC BLOOD PRESSURE: 110 MMHG | WEIGHT: 116 LBS | HEART RATE: 68 BPM | TEMPERATURE: 98.5 F | HEIGHT: 59 IN | DIASTOLIC BLOOD PRESSURE: 68 MMHG | BODY MASS INDEX: 23.39 KG/M2

## 2022-04-27 DIAGNOSIS — E87.1 HYPONATREMIA: Chronic | ICD-10-CM

## 2022-04-27 DIAGNOSIS — F41.1 GENERALIZED ANXIETY DISORDER: ICD-10-CM

## 2022-04-27 DIAGNOSIS — R73.01 IMPAIRED FASTING GLUCOSE: Chronic | ICD-10-CM

## 2022-04-27 DIAGNOSIS — D51.8 VITAMIN B12 DEFICIENCY (DIETARY) ANEMIA: Chronic | ICD-10-CM

## 2022-04-27 DIAGNOSIS — E03.8 SUBCLINICAL HYPOTHYROIDISM: ICD-10-CM

## 2022-04-27 DIAGNOSIS — M54.50 CHRONIC BILATERAL LOW BACK PAIN WITHOUT SCIATICA: ICD-10-CM

## 2022-04-27 DIAGNOSIS — E78.2 MIXED HYPERLIPIDEMIA: Chronic | ICD-10-CM

## 2022-04-27 DIAGNOSIS — E87.6 HYPOKALEMIA: Chronic | ICD-10-CM

## 2022-04-27 DIAGNOSIS — D50.8 IRON DEFICIENCY ANEMIA SECONDARY TO INADEQUATE DIETARY IRON INTAKE: Chronic | ICD-10-CM

## 2022-04-27 DIAGNOSIS — G47.9 SLEEP DISORDER: Chronic | ICD-10-CM

## 2022-04-27 DIAGNOSIS — M81.0 AGE-RELATED OSTEOPOROSIS WITHOUT CURRENT PATHOLOGICAL FRACTURE: Chronic | ICD-10-CM

## 2022-04-27 DIAGNOSIS — I10 ESSENTIAL HYPERTENSION: Primary | Chronic | ICD-10-CM

## 2022-04-27 DIAGNOSIS — R91.1 NODULE OF LEFT LUNG: Chronic | ICD-10-CM

## 2022-04-27 DIAGNOSIS — I71.20 THORACIC AORTIC ANEURYSM WITHOUT RUPTURE: Chronic | ICD-10-CM

## 2022-04-27 DIAGNOSIS — G89.29 CHRONIC BILATERAL LOW BACK PAIN WITHOUT SCIATICA: ICD-10-CM

## 2022-04-27 PROCEDURE — 99214 OFFICE O/P EST MOD 30 MIN: CPT | Performed by: INTERNAL MEDICINE

## 2022-04-27 RX ORDER — ALPRAZOLAM 0.5 MG/1
TABLET ORAL
Qty: 60 TABLET | Refills: 2 | Status: SHIPPED | OUTPATIENT
Start: 2022-04-27

## 2022-04-27 RX ORDER — TRAMADOL HYDROCHLORIDE 50 MG/1
50 TABLET ORAL 2 TIMES DAILY PRN
Qty: 60 TABLET | Refills: 2 | Status: SHIPPED | OUTPATIENT
Start: 2022-04-27 | End: 2022-10-26 | Stop reason: SDUPTHER

## 2022-04-27 NOTE — PROGRESS NOTES
Chief Complaint  Follow-up (6 month. FYI... she went to [facility] ER 03/31/22 for confusion but Urine and CT was normal. No other episodes), Med Refill (Xanax and Tramadol), and Headache (All the time but chronic. Her Tramadol/Tylenol does help the pain )    Subjective          History of Present Illness     Brittney Rollins presents to the office for 6-month follow up on hyperlipidemia, hypertension, GERD, osteoporosis, REY, and hypokalemia among other issues.  She has a mild thoracic aortic aneurysm and also has an incidental finding of 8 mm left pulmonary nodule, which appeared benign on CT 03/2019 .  We will plan to repeat CT of the chest with her next visit.      Patient was seen in the ER at [facility] 03/312022 for episode of confusion, which lasted about 1/2 days.  She could not differentiate between telephone and remote control.  MRI of the brain was negative for stroke of other acute findings. CTA also ruled out acute findings.  She has had no other episodes since ER visit.  Patient reports fair control of GERD symptoms with avoiding spicy foods. I recommended we start patient on enteric coated 81 mg aspirin q.o.d. to help decrease cardiovascular risk.  She admits forgetting names and her daughter is also noticing mild memory and cognitive deficits, which she feels is age related.  Her daughter oversees patient's finances and gets her groceries, but overall patient feels she manages well for her age of 87.              She continues to take Xanax chronically for anxiety/sleep. She takes one Tramadol/Tylenol daily adding a 2nd dose of Tramadol and Tylenol combination for more severe chronic lower lumbar back pain.  She also reports chronic daily headaches, but states the Tramadol/Tylenol helps manage the headache pain.  Denies significant side effects with the Tramadol and Xanax including constipation or excessive daytime sedation    Weight is stable.  BP and HR at goal.     The patient's relevant past medical,  "surgical, and social history was reviewed in Epic.   Lab results are reviewed with the patient today. CBC unremarkable.  Fasting glucose 103. A1c 4.9.  Normal renal function.  B-12 and vitamin D at goal with oral supplements.  AST slightly elevated.  TSH is elevated at 5.36 suggesting she is possibly developing hypothyroidism, for which we will check Free T4 with next set of labs.  LDL at goal at 60 with Lipitor. Cholesterol ratio 1.1.       Objective   Vital Signs:   /68   Pulse 68   Temp 98.5 °F (36.9 °C) (Infrared)   Ht 149.9 cm (59\")   Wt 52.6 kg (116 lb)   BMI 23.43 kg/m²       Physical Exam  Vitals reviewed.   Constitutional:       General: She is not in acute distress.     Appearance: She is well-developed.      Comments: Pleasant female, accompanied by her daughter, Mary HOLLAND:      Head: Normocephalic and atraumatic.      Nose:      Right Sinus: No maxillary sinus tenderness or frontal sinus tenderness.      Left Sinus: No maxillary sinus tenderness or frontal sinus tenderness.      Mouth/Throat:      Mouth: No oral lesions.      Pharynx: Uvula midline.      Tonsils: No tonsillar exudate.   Eyes:      Conjunctiva/sclera: Conjunctivae normal.      Pupils: Pupils are equal, round, and reactive to light.   Neck:      Thyroid: No thyroid mass or thyromegaly.      Vascular: No carotid bruit or JVD.      Trachea: Trachea normal. No tracheal deviation.   Cardiovascular:      Rate and Rhythm: Normal rate and regular rhythm.  No extrasystoles are present.     Chest Wall: PMI is not displaced.      Heart sounds: Normal heart sounds. No murmur heard.  Pulmonary:      Effort: Pulmonary effort is normal. No accessory muscle usage or respiratory distress.      Breath sounds: Normal breath sounds. No decreased breath sounds, wheezing, rhonchi or rales.   Abdominal:      General: Bowel sounds are normal. There is no distension.      Palpations: Abdomen is soft.      Tenderness: There is no abdominal " tenderness.   Musculoskeletal:      Cervical back: Neck supple.   Lymphadenopathy:      Cervical: No cervical adenopathy.   Skin:     General: Skin is warm and dry.      Findings: No rash.      Nails: There is no clubbing.   Neurological:      Mental Status: She is alert and oriented to person, place, and time.      Cranial Nerves: No cranial nerve deficit.      Coordination: Coordination normal.   Psychiatric:         Speech: Speech normal.         Behavior: Behavior normal.         Thought Content: Thought content normal.         Judgment: Judgment normal.            Result Review :     CMP    CMP 9/16/21 3/31/22 4/15/22   Glucose 97  103 (A)   Glucose  122 (A)    BUN 9 7 11   Creatinine 0.85 1.1 (A) 0.89   eGFR Non African Am 63     Sodium 127 (A) 131 (A) 129 (A)   Potassium 4.5 3.5 4.1   Chloride 95 (A) 94 (A) 97 (A)   Calcium 9.5 9.1 9.5   Albumin 4.60 4.4 4.30   Total Bilirubin 0.6 0.90 1.0   Alkaline Phosphatase 74 82 69   AST (SGOT) 40 (A) 35 42 (A)   ALT (SGPT) 31 36 33   (A) Abnormal value            CBC w/diff    CBC w/Diff 9/16/21 4/15/22   WBC 3.81 4.78   RBC 3.79 3.92   Hemoglobin 12.2 12.6   Hematocrit 34.5 36.3   MCV 91.0 92.6   MCH 32.2 32.1   MCHC 35.4 34.7   RDW 11.9 (A) 12.3   Platelets 179 172   Neutrophil Rel % 42.8 61.6   Lymphocyte Rel % 39.1 25.7   Monocyte Rel % 13.9 (A) 10.0   Eosinophil Rel % 3.9 2.5   Basophil Rel % 0.3 0.2   (A) Abnormal value            Lipid Panel    Lipid Panel 9/16/21 4/15/22   Total Cholesterol  152   Triglycerides  155 (A)   HDL Cholesterol  57   VLDL Cholesterol  26   LDL Cholesterol  56 69   LDL/HDL Ratio  1.12   (A) Abnormal value            TSH    TSH 4/15/22   TSH 5.360 (A)   (A) Abnormal value            A1C Last 3 Results    HGBA1C Last 3 Results 9/16/21 4/15/22   Hemoglobin A1C 5.10 4.90           Data reviewed: Recent hospitalization notes ER [facility] 03/31/2022          Assessment and Plan    Diagnoses and all orders for this visit:    1. Essential  hypertension (Primary)  -     CBC Auto Differential; Future  -     Comprehensive Metabolic Panel; Future  -     LDL Cholesterol, Direct; Future    2. Mixed hyperlipidemia  -     LDL Cholesterol, Direct; Future    3. Thoracic aortic aneurysm without rupture (HCC)    4. Impaired fasting glucose  -     Comprehensive Metabolic Panel; Future  -     Hemoglobin A1c; Future    5. Hypokalemia  -     Comprehensive Metabolic Panel; Future  -     Magnesium; Future    6. Generalized anxiety disorder  -     ALPRAZolam (XANAX) 0.5 MG tablet; TAKE 1/2 TO 1 TABLET BY MOUTH TWO TIMES A DAY prn  Dispense: 60 tablet; Refill: 2    7. Chronic bilateral low back pain without sciatica  -     traMADol (ULTRAM) 50 MG tablet; Take 1 tablet by mouth 2 (Two) Times a Day As Needed for Severe Pain . Take Tylenol 500 mg with each dose  Dispense: 60 tablet; Refill: 2    8. Vitamin B12 deficiency (dietary) anemia  -     CBC Auto Differential; Future  -     Vitamin B12; Future    9. Iron deficiency anemia secondary to inadequate dietary iron intake  -     CBC Auto Differential; Future  -     Ferritin; Future  -     Iron Profile; Future    10. Age-related osteoporosis without current pathological fracture  -     Vitamin D 25 Hydroxy; Future    11. Nodule of left lung    12. Sleep disorder    13. Hyponatremia  -     Comprehensive Metabolic Panel; Future    14. Subclinical hypothyroidism  -     TSH; Future  -     T4, free; Future           We will have patient start enteric coated 81 mg aspirin q.o.d. to decrease cardiovascular risk. Continue the vascular risk factor modifications, including Lipitor, lisinopril and atenolol.  Cholesterol at goal.  Blood pressure at goal.  Continue to monitor BP/heart rate and notify me if not consistently at goal.  She should go to the ER for any recurrence of the confusion.   Continue oral potassium and lisinopril.  Hypokalemia at goal.  Continue Lipitor.  Cholesterol at goal.     Continue oral iron and B-12  supplements.      Continue the Tramadol/Tylenol, which gives reasonable control of chronic low back and neck pain.  Continue the Prilosec for GERD and notify us if symptoms are not adequately managed.  Continue Xanax for REY. Patient understands the risks associated with this controlled medication, including tolerance and addiction.  She also agrees to only obtain this medication from me, and not from a another provider, unless that provider is covering for me in my absence. She also agrees to be compliant in dosing, and not self adjust the dose of medication.  A signed controlled substance agreement is on file, and she has received a controlled substance education sheet at this a previous visit.  she has also signed a consent for treatment with a controlled substance as per [facility] policy. CONCHA was obtained.    Return in six months for routine follow up with fasting labs one week prior or sooner if needed  We will plan to schedule CT of the chest to evaluate mild thoracic aortic aneurysm and also has an incidental finding of 8 mm left pulmonary nodule.    Scribed for Dr. Dillon by Sophie Méndez [facility].     Follow Up   Return in about 6 months (around 10/27/2022) for Follow up in six months with labs one week prior..  Patient was given instructions and counseling regarding her condition or for health maintenance advice. Please see specific information pulled into the AVS if appropriate.

## 2022-08-15 DIAGNOSIS — M54.50 CHRONIC BILATERAL LOW BACK PAIN WITHOUT SCIATICA: ICD-10-CM

## 2022-08-15 DIAGNOSIS — G89.29 CHRONIC BILATERAL LOW BACK PAIN WITHOUT SCIATICA: ICD-10-CM

## 2022-08-15 RX ORDER — TRAMADOL HYDROCHLORIDE 50 MG/1
50 TABLET ORAL 2 TIMES DAILY PRN
Qty: 60 TABLET | Refills: 2 | OUTPATIENT
Start: 2022-08-15

## 2022-08-24 DIAGNOSIS — G89.29 CHRONIC BILATERAL LOW BACK PAIN WITHOUT SCIATICA: ICD-10-CM

## 2022-08-24 DIAGNOSIS — F41.1 GENERALIZED ANXIETY DISORDER: ICD-10-CM

## 2022-08-24 DIAGNOSIS — M54.50 CHRONIC BILATERAL LOW BACK PAIN WITHOUT SCIATICA: ICD-10-CM

## 2022-08-24 RX ORDER — ALPRAZOLAM 0.5 MG/1
TABLET ORAL
Qty: 60 TABLET | Refills: 2 | OUTPATIENT
Start: 2022-08-24

## 2022-08-24 RX ORDER — TRAMADOL HYDROCHLORIDE 50 MG/1
50 TABLET ORAL 2 TIMES DAILY PRN
Qty: 60 TABLET | Refills: 2 | OUTPATIENT
Start: 2022-08-24

## 2022-10-11 RX ORDER — POTASSIUM CHLORIDE 750 MG/1
10 TABLET, FILM COATED, EXTENDED RELEASE ORAL DAILY
Qty: 90 TABLET | Refills: 1 | Status: SHIPPED | OUTPATIENT
Start: 2022-10-11 | End: 2023-01-13

## 2022-10-11 RX ORDER — OMEPRAZOLE 40 MG/1
40 CAPSULE, DELAYED RELEASE ORAL EVERY MORNING
Qty: 90 CAPSULE | Refills: 1 | Status: SHIPPED | OUTPATIENT
Start: 2022-10-11 | End: 2023-01-13

## 2022-10-11 RX ORDER — ATORVASTATIN CALCIUM 10 MG/1
10 TABLET, FILM COATED ORAL DAILY
Qty: 90 TABLET | Refills: 1 | Status: SHIPPED | OUTPATIENT
Start: 2022-10-11 | End: 2023-01-05

## 2022-10-20 RX ORDER — ATENOLOL 50 MG/1
50 TABLET ORAL DAILY
Qty: 90 TABLET | Refills: 0 | Status: SHIPPED | OUTPATIENT
Start: 2022-10-20 | End: 2023-01-20

## 2022-10-26 ENCOUNTER — LAB (OUTPATIENT)
Dept: LAB | Facility: OTHER | Age: 87
End: 2022-10-26

## 2022-10-26 ENCOUNTER — OFFICE VISIT (OUTPATIENT)
Dept: FAMILY MEDICINE CLINIC | Facility: CLINIC | Age: 87
End: 2022-10-26

## 2022-10-26 VITALS
HEIGHT: 59 IN | TEMPERATURE: 98.7 F | SYSTOLIC BLOOD PRESSURE: 120 MMHG | BODY MASS INDEX: 22.54 KG/M2 | HEART RATE: 74 BPM | OXYGEN SATURATION: 99 % | DIASTOLIC BLOOD PRESSURE: 62 MMHG | WEIGHT: 111.8 LBS

## 2022-10-26 DIAGNOSIS — Z09 HOSPITAL DISCHARGE FOLLOW-UP: ICD-10-CM

## 2022-10-26 DIAGNOSIS — F41.1 GENERALIZED ANXIETY DISORDER: Chronic | ICD-10-CM

## 2022-10-26 DIAGNOSIS — I10 ESSENTIAL HYPERTENSION: Chronic | ICD-10-CM

## 2022-10-26 DIAGNOSIS — F03.A0 MILD DEMENTIA WITHOUT BEHAVIORAL DISTURBANCE, PSYCHOTIC DISTURBANCE, MOOD DISTURBANCE, OR ANXIETY, UNSPECIFIED DEMENTIA TYPE: Chronic | ICD-10-CM

## 2022-10-26 DIAGNOSIS — M54.50 CHRONIC BILATERAL LOW BACK PAIN WITHOUT SCIATICA: Chronic | ICD-10-CM

## 2022-10-26 DIAGNOSIS — Z09 HOSPITAL DISCHARGE FOLLOW-UP: Primary | ICD-10-CM

## 2022-10-26 DIAGNOSIS — Z86.59 MENTAL STATUS CHANGE RESOLVED: Chronic | ICD-10-CM

## 2022-10-26 DIAGNOSIS — F33.41 RECURRENT MAJOR DEPRESSIVE DISORDER, IN PARTIAL REMISSION: Chronic | ICD-10-CM

## 2022-10-26 DIAGNOSIS — I10 ESSENTIAL HYPERTENSION: ICD-10-CM

## 2022-10-26 DIAGNOSIS — E87.1 HYPONATREMIA: ICD-10-CM

## 2022-10-26 DIAGNOSIS — F03.A0 MILD DEMENTIA WITHOUT BEHAVIORAL DISTURBANCE, PSYCHOTIC DISTURBANCE, MOOD DISTURBANCE, OR ANXIETY, UNSPECIFIED DEMENTIA TYPE: ICD-10-CM

## 2022-10-26 DIAGNOSIS — E83.42 HYPOMAGNESEMIA: ICD-10-CM

## 2022-10-26 DIAGNOSIS — G89.29 CHRONIC BILATERAL LOW BACK PAIN WITHOUT SCIATICA: Chronic | ICD-10-CM

## 2022-10-26 LAB
ALBUMIN SERPL-MCNC: 4.5 G/DL (ref 3.5–5)
ALBUMIN/GLOB SERPL: 1.6 G/DL (ref 1.1–1.8)
ALP SERPL-CCNC: 97 U/L (ref 38–126)
ALT SERPL W P-5'-P-CCNC: 27 U/L
ANION GAP SERPL CALCULATED.3IONS-SCNC: 7 MMOL/L (ref 5–15)
AST SERPL-CCNC: 40 U/L (ref 14–36)
BILIRUB SERPL-MCNC: 0.7 MG/DL (ref 0.2–1.3)
BUN SERPL-MCNC: 11 MG/DL (ref 7–23)
BUN/CREAT SERPL: 12.2 (ref 7–25)
CALCIUM SPEC-SCNC: 9.2 MG/DL (ref 8.4–10.2)
CHLORIDE SERPL-SCNC: 103 MMOL/L (ref 101–112)
CO2 SERPL-SCNC: 23 MMOL/L (ref 22–30)
CREAT SERPL-MCNC: 0.9 MG/DL (ref 0.52–1.04)
DEPRECATED RDW RBC AUTO: 45.7 FL (ref 37–54)
EGFRCR SERPLBLD CKD-EPI 2021: 62 ML/MIN/1.73
EOSINOPHIL # BLD MANUAL: 0.17 10*3/MM3 (ref 0–0.4)
EOSINOPHIL NFR BLD MANUAL: 5 % (ref 0.3–6.2)
ERYTHROCYTE [DISTWIDTH] IN BLOOD BY AUTOMATED COUNT: 13.9 % (ref 12.3–15.4)
GLOBULIN UR ELPH-MCNC: 2.8 GM/DL (ref 2.3–3.5)
GLUCOSE SERPL-MCNC: 90 MG/DL (ref 70–99)
HCT VFR BLD AUTO: 32.9 % (ref 34–46.6)
HGB BLD-MCNC: 11 G/DL (ref 12–15.9)
LYMPHOCYTES # BLD MANUAL: 0.96 10*3/MM3 (ref 0.7–3.1)
LYMPHOCYTES NFR BLD MANUAL: 14 % (ref 5–12)
MAGNESIUM SERPL-MCNC: 1.4 MG/DL (ref 1.6–2.3)
MCH RBC QN AUTO: 31.4 PG (ref 26.6–33)
MCHC RBC AUTO-ENTMCNC: 33.4 G/DL (ref 31.5–35.7)
MCV RBC AUTO: 94 FL (ref 79–97)
MONOCYTES # BLD: 0.46 10*3/MM3 (ref 0.1–0.9)
NEUTROPHILS # BLD AUTO: 1.72 10*3/MM3 (ref 1.7–7)
NEUTROPHILS NFR BLD MANUAL: 52 % (ref 42.7–76)
PLATELET # BLD AUTO: 183 10*3/MM3 (ref 140–450)
PMV BLD AUTO: 9.4 FL (ref 6–12)
POTASSIUM SERPL-SCNC: 4.1 MMOL/L (ref 3.4–5)
PROT SERPL-MCNC: 7.3 G/DL (ref 6.3–8.6)
RBC # BLD AUTO: 3.5 10*6/MM3 (ref 3.77–5.28)
RBC MORPH BLD: NORMAL
SMALL PLATELETS BLD QL SMEAR: ADEQUATE
SODIUM SERPL-SCNC: 133 MMOL/L (ref 137–145)
VARIANT LYMPHS NFR BLD MANUAL: 29 % (ref 19.6–45.3)
WBC MORPH BLD: NORMAL
WBC NRBC COR # BLD: 3.31 10*3/MM3 (ref 3.4–10.8)

## 2022-10-26 PROCEDURE — 80053 COMPREHEN METABOLIC PANEL: CPT | Performed by: INTERNAL MEDICINE

## 2022-10-26 PROCEDURE — 83735 ASSAY OF MAGNESIUM: CPT | Performed by: INTERNAL MEDICINE

## 2022-10-26 PROCEDURE — 85025 COMPLETE CBC W/AUTO DIFF WBC: CPT | Performed by: INTERNAL MEDICINE

## 2022-10-26 PROCEDURE — 99214 OFFICE O/P EST MOD 30 MIN: CPT | Performed by: INTERNAL MEDICINE

## 2022-10-26 PROCEDURE — 36415 COLL VENOUS BLD VENIPUNCTURE: CPT | Performed by: INTERNAL MEDICINE

## 2022-10-26 RX ORDER — ESCITALOPRAM OXALATE 5 MG/1
5 TABLET ORAL DAILY
Qty: 90 TABLET | Refills: 3 | Status: SHIPPED | OUTPATIENT
Start: 2022-10-26

## 2022-10-26 RX ORDER — TRAMADOL HYDROCHLORIDE 50 MG/1
50 TABLET ORAL 2 TIMES DAILY PRN
Qty: 60 TABLET | Refills: 2 | Status: SHIPPED | OUTPATIENT
Start: 2022-10-26

## 2022-10-26 RX ORDER — MEMANTINE HYDROCHLORIDE 5 MG/1
5 TABLET ORAL 2 TIMES DAILY
Qty: 180 TABLET | Refills: 3 | Status: SHIPPED | OUTPATIENT
Start: 2022-10-26

## 2022-10-26 NOTE — PROGRESS NOTES
Chief Complaint  Follow-up (Post [facility] hospital appt 10/18-10/20 for UTI. She has finished her meds ) and Anxiety (Daughter states she is anxious. She had Cymbalta ordered but hasnt been taking it )    Subjective        History of Present Illness     Brittney Rollins presents to the office accompanied by her daughter, Iliana Nash,  for hospital follow up.  Iliana is also scheduled to establish care in our practice next week.  She continues to live at home independently during the daytime with her daughter spending nights with her.  Home Health and PT have been at her home this week.      Patient was admitted to [facility] 10/18/2022 after family found patient lethargic and confused. Patient's daughter had been noticing some memory issues prior to the recent hospitalization, although, patient has been reluctant to start medications for dementia.  Upon arrival to ED patient was rather combative and aggressive with staff.  CT head negative for acute intracranial process. UDS negative. UA revealed UTI, for which patient was treated with IV fluids and Rocephin and repletion of magnesium for hypomagnesemia.  Sodium was low on admission, back to normal on discharge.  Following morning patient was alert oriented to person and place and back to her baseline mentation.  Discharged home 10/20/2022 with 3-day course of oral Keflex 500 mg b.i.d., which she has completed.       Patient takes Xanax p.r.n, but has been refusing SSRI.  Patient and her daughter state she is not struggling with frequent anxiety.  We used low dose Lexapro for a while, but she stopped and we switched to Cymbalta, which patient reported caused GI upset.  When she was here in April, she admitted forgetting names.  Her daughter has also noticed mild memory and cognitive deficits, which she feels is age related.  We discussed getting her on Namenda.  She may not tolerate Aricept well due to potential GI side effects      She struggles with chronic back pain  "and headaches.  She takes one Tramadol/Tylenol daily adding a 2nd dose of Tramadol and Tylenol combination for more severe chronic pain. She takes no more than 4 Tylenol daily.  Denies significant side effects with the Tramadol and Xanax including constipation or excessive daytime sedation    Weight down 5 pounds in the past 6 months.  She reports good appetite.         Objective   Vital Signs:  /62   Pulse 74   Temp 98.7 °F (37.1 °C)   Ht 149.9 cm (59\")   Wt 50.7 kg (111 lb 12.8 oz)   SpO2 99%   BMI 22.58 kg/m²   Estimated body mass index is 22.58 kg/m² as calculated from the following:    Height as of this encounter: 149.9 cm (59\").    Weight as of this encounter: 50.7 kg (111 lb 12.8 oz).    BMI is within normal parameters. No other follow-up for BMI required.      Physical Exam  Vitals reviewed.   Constitutional:       General: She is not in acute distress.     Appearance: She is well-developed.      Comments: Pleasant lady,  Accompanied by her daughter, Iliana.    HENT:      Head: Normocephalic and atraumatic.      Nose:      Right Sinus: No maxillary sinus tenderness or frontal sinus tenderness.      Left Sinus: No maxillary sinus tenderness or frontal sinus tenderness.      Mouth/Throat:      Mouth: No oral lesions.      Pharynx: Uvula midline.      Tonsils: No tonsillar exudate.   Eyes:      Conjunctiva/sclera: Conjunctivae normal.      Pupils: Pupils are equal, round, and reactive to light.   Neck:      Thyroid: No thyroid mass or thyromegaly.      Vascular: No carotid bruit or JVD.      Trachea: Trachea normal. No tracheal deviation.   Cardiovascular:      Rate and Rhythm: Normal rate and regular rhythm.  No extrasystoles are present.     Chest Wall: PMI is not displaced.      Heart sounds: Normal heart sounds. No murmur heard.  Pulmonary:      Effort: Pulmonary effort is normal. No accessory muscle usage or respiratory distress.      Breath sounds: Normal breath sounds. No decreased breath " sounds, wheezing, rhonchi or rales.   Abdominal:      General: Bowel sounds are normal. There is no distension.      Palpations: Abdomen is soft.      Tenderness: There is no abdominal tenderness.   Musculoskeletal:      Cervical back: Neck supple.   Lymphadenopathy:      Cervical: No cervical adenopathy.   Skin:     General: Skin is warm and dry.      Findings: No rash.      Nails: There is no clubbing.   Neurological:      Mental Status: She is alert and oriented to person, place, and time.      Cranial Nerves: No cranial nerve deficit.      Coordination: Coordination normal.   Psychiatric:         Speech: Speech normal.         Behavior: Behavior normal.         Thought Content: Thought content normal.         Judgment: Judgment normal.        Future Appointments   Date Time Provider Department Center   12/2/2022  1:30 PM Pollo Dillon MD [facility]         Result Review :    Common labs    Common Labs 10/19/22 10/20/22 10/26/22 10/26/22      1530 1530   Glucose   90    Glucose 89 93     BUN 8 11 11    Creatinine 0.9 0.9 0.90    Sodium 135 (A) 139 133 (A)    Potassium 4.1 3.7 4.1    Chloride 104 106 103    Calcium 8.6 8.6 9.2    Albumin   4.50    Total Bilirubin   0.7    Alkaline Phosphatase   97    AST (SGOT)   40 (A)    ALT (SGPT)   27    WBC    3.31 (A)   Hemoglobin    11.0 (A)   Hematocrit    32.9 (A)   Platelets    183   (A) Abnormal value            Data reviewed: Recent hospitalization notes [facility] 10/18/2022-10/20/2022          Assessment and Plan   Diagnoses and all orders for this visit:    1. Hospital discharge follow-up (Primary)  -     CBC Auto Differential; Future  -     Comprehensive Metabolic Panel; Future  -     Magnesium; Future    2. Mild dementia without behavioral disturbance, psychotic disturbance, mood disturbance, or anxiety, unspecified dementia type  -     CBC Auto Differential; Future    3. Hypomagnesemia  -     Comprehensive Metabolic Panel; Future  -     Magnesium; Future    4.  Hyponatremia  -     Comprehensive Metabolic Panel; Future    5. Essential hypertension  -     CBC Auto Differential; Future    6. Generalized anxiety disorder    7. Recurrent major depressive disorder, in partial remission (HCC)    8. Chronic bilateral low back pain without sciatica  -     traMADol (ULTRAM) 50 MG tablet; Take 1 tablet by mouth 2 (Two) Times a Day As Needed for Severe Pain. Take Tylenol 500 mg with each dose  Dispense: 60 tablet; Refill: 2    9. Mental status change resolved    Other orders  -     escitalopram (Lexapro) 5 MG tablet; Take 1 tablet by mouth Daily.  Dispense: 90 tablet; Refill: 3  -     memantine (Namenda) 5 MG tablet; Take 1 tablet by mouth 2 (Two) Times a Day. For memory  Dispense: 180 tablet; Refill: 3  -     magnesium oxide 250 MG tablet; Take 1 tablet by mouth Daily.  Dispense: 90 tablet; Refill: 3                 Orders are placed for patient to stop by the lab for CBC, CMP, and magnesium.   We will notify patient's daughter, Iliana, with results.  Labs are back and reveal mild anemia and mild hyponatremia, not needing additional intervention.  Magnesium is below goal.  We will have her start mag oxide 250 mg daily.  I sent a prescription to her pharmacy, but it can also be purchased OTC.  We will recheck magnesium level with her upcoming labs.       To address Alzheimer's dementia, I sent a prescription for Namenda 5 mg to take one q.d. for 3-4 progressing to b.i.d  Aricept has the potential to exacerbate GI symptoms in this patient with history of IBS and gastritis.  After 3-4 weeks on the Namenda, we will have her add Lexapro, for which prescription is sent for Lexapro 5 mg to take one q.d.     Recommended massage to help relieve cervical muscle spasm.  Continue the Tramadol/Tylenol, which gives reasonable control of chronic low back and neck pain. Continue the Prilosec for GERD and notify us if symptoms are not adequately managed. Patient understands the risks associated with  this controlled medication, including tolerance and addiction. She also agrees to only obtain this medication from me, and not from a another provider, unless that provider is covering for me in my absence. She also agrees to be compliant in dosing, and not self adjust the dose of medication. A signed controlled substance agreement is on file, and she has received a controlled substance education sheet at this a previous visit. she has also signed a consent for treatment with a controlled substance as per [facility] policy. [facility] was obtained.    Return in 5 weeks for routine follow up with fasting labs one week prior or sooner if needed.     Scribed for Dr. Dillon by Laurie Fregoso.      Follow Up   Return in about 5 weeks (around 11/30/2022), or if symptoms worsen or fail to improve, for Next scheduled follow up, Next scheduled follow up - labs 1 week prior.  Patient was given instructions and counseling regarding her condition or for health maintenance advice. Please see specific information pulled into the AVS if appropriate.

## 2022-10-27 NOTE — PROGRESS NOTES
Please inform her daughter, Iliana, that her repeat labs revealed magnesium is still low.  Start magnesium oxide 250 mg daily.  I sent a prescription to [facility] pharmacy, but it can also be purchased OTC.  She still has a mild anemia and sodium is still a little low, but no other intervention needed for those at this time.  We will be rechecking those in 1 month.  EB

## 2022-12-02 ENCOUNTER — LAB (OUTPATIENT)
Dept: LAB | Facility: OTHER | Age: 87
End: 2022-12-02

## 2022-12-02 DIAGNOSIS — E78.2 MIXED HYPERLIPIDEMIA: Chronic | ICD-10-CM

## 2022-12-02 DIAGNOSIS — R73.01 IMPAIRED FASTING GLUCOSE: ICD-10-CM

## 2022-12-02 DIAGNOSIS — D50.8 IRON DEFICIENCY ANEMIA SECONDARY TO INADEQUATE DIETARY IRON INTAKE: Chronic | ICD-10-CM

## 2022-12-02 DIAGNOSIS — E87.6 HYPOKALEMIA: Chronic | ICD-10-CM

## 2022-12-02 DIAGNOSIS — I10 ESSENTIAL HYPERTENSION: Chronic | ICD-10-CM

## 2022-12-02 DIAGNOSIS — M81.0 AGE-RELATED OSTEOPOROSIS WITHOUT CURRENT PATHOLOGICAL FRACTURE: Chronic | ICD-10-CM

## 2022-12-02 DIAGNOSIS — E87.1 HYPONATREMIA: Chronic | ICD-10-CM

## 2022-12-02 DIAGNOSIS — D51.8 VITAMIN B12 DEFICIENCY (DIETARY) ANEMIA: Chronic | ICD-10-CM

## 2022-12-02 DIAGNOSIS — E03.8 SUBCLINICAL HYPOTHYROIDISM: ICD-10-CM

## 2022-12-02 LAB
ALBUMIN SERPL-MCNC: 4.4 G/DL (ref 3.5–5)
ALBUMIN/GLOB SERPL: 1.4 G/DL (ref 1.1–1.8)
ALP SERPL-CCNC: 85 U/L (ref 38–126)
ALT SERPL W P-5'-P-CCNC: 33 U/L
ANION GAP SERPL CALCULATED.3IONS-SCNC: 9 MMOL/L (ref 5–15)
AST SERPL-CCNC: 38 U/L (ref 14–36)
BASOPHILS # BLD AUTO: 0.02 10*3/MM3 (ref 0–0.2)
BASOPHILS NFR BLD AUTO: 0.4 % (ref 0–1.5)
BILIRUB SERPL-MCNC: 0.9 MG/DL (ref 0.2–1.3)
BUN SERPL-MCNC: 9 MG/DL (ref 7–23)
BUN/CREAT SERPL: 10 (ref 7–25)
CALCIUM SPEC-SCNC: 9.3 MG/DL (ref 8.4–10.2)
CHLORIDE SERPL-SCNC: 97 MMOL/L (ref 101–112)
CO2 SERPL-SCNC: 24 MMOL/L (ref 22–30)
CREAT SERPL-MCNC: 0.9 MG/DL (ref 0.52–1.04)
DEPRECATED RDW RBC AUTO: 40.6 FL (ref 37–54)
EGFRCR SERPLBLD CKD-EPI 2021: 62 ML/MIN/1.73
EOSINOPHIL # BLD AUTO: 0.1 10*3/MM3 (ref 0–0.4)
EOSINOPHIL NFR BLD AUTO: 2.2 % (ref 0.3–6.2)
ERYTHROCYTE [DISTWIDTH] IN BLOOD BY AUTOMATED COUNT: 12.4 % (ref 12.3–15.4)
GLOBULIN UR ELPH-MCNC: 3.2 GM/DL (ref 2.3–3.5)
GLUCOSE SERPL-MCNC: 100 MG/DL (ref 70–99)
HCT VFR BLD AUTO: 35 % (ref 34–46.6)
HGB BLD-MCNC: 12.5 G/DL (ref 12–15.9)
LYMPHOCYTES # BLD AUTO: 0.55 10*3/MM3 (ref 0.7–3.1)
LYMPHOCYTES NFR BLD AUTO: 12.1 % (ref 19.6–45.3)
MAGNESIUM SERPL-MCNC: 1.7 MG/DL (ref 1.6–2.3)
MCH RBC QN AUTO: 32.4 PG (ref 26.6–33)
MCHC RBC AUTO-ENTMCNC: 35.7 G/DL (ref 31.5–35.7)
MCV RBC AUTO: 90.7 FL (ref 79–97)
MONOCYTES # BLD AUTO: 0.39 10*3/MM3 (ref 0.1–0.9)
MONOCYTES NFR BLD AUTO: 8.6 % (ref 5–12)
NEUTROPHILS NFR BLD AUTO: 3.5 10*3/MM3 (ref 1.7–7)
NEUTROPHILS NFR BLD AUTO: 76.7 % (ref 42.7–76)
PLATELET # BLD AUTO: 193 10*3/MM3 (ref 140–450)
PMV BLD AUTO: 9.1 FL (ref 6–12)
POTASSIUM SERPL-SCNC: 4.3 MMOL/L (ref 3.4–5)
PROT SERPL-MCNC: 7.6 G/DL (ref 6.3–8.6)
RBC # BLD AUTO: 3.86 10*6/MM3 (ref 3.77–5.28)
SODIUM SERPL-SCNC: 130 MMOL/L (ref 137–145)
WBC NRBC COR # BLD: 4.56 10*3/MM3 (ref 3.4–10.8)

## 2022-12-02 PROCEDURE — 83735 ASSAY OF MAGNESIUM: CPT | Performed by: INTERNAL MEDICINE

## 2022-12-02 PROCEDURE — 82607 VITAMIN B-12: CPT | Performed by: INTERNAL MEDICINE

## 2022-12-02 PROCEDURE — 80053 COMPREHEN METABOLIC PANEL: CPT | Performed by: INTERNAL MEDICINE

## 2022-12-02 PROCEDURE — 83036 HEMOGLOBIN GLYCOSYLATED A1C: CPT | Performed by: INTERNAL MEDICINE

## 2022-12-02 PROCEDURE — 84443 ASSAY THYROID STIM HORMONE: CPT | Performed by: INTERNAL MEDICINE

## 2022-12-02 PROCEDURE — 82306 VITAMIN D 25 HYDROXY: CPT | Performed by: INTERNAL MEDICINE

## 2022-12-02 PROCEDURE — 36415 COLL VENOUS BLD VENIPUNCTURE: CPT | Performed by: INTERNAL MEDICINE

## 2022-12-02 PROCEDURE — 83721 ASSAY OF BLOOD LIPOPROTEIN: CPT | Performed by: INTERNAL MEDICINE

## 2022-12-02 PROCEDURE — 84466 ASSAY OF TRANSFERRIN: CPT | Performed by: INTERNAL MEDICINE

## 2022-12-02 PROCEDURE — 82728 ASSAY OF FERRITIN: CPT | Performed by: INTERNAL MEDICINE

## 2022-12-02 PROCEDURE — 85025 COMPLETE CBC W/AUTO DIFF WBC: CPT | Performed by: INTERNAL MEDICINE

## 2022-12-02 PROCEDURE — 83540 ASSAY OF IRON: CPT | Performed by: INTERNAL MEDICINE

## 2022-12-02 PROCEDURE — 84439 ASSAY OF FREE THYROXINE: CPT | Performed by: INTERNAL MEDICINE

## 2022-12-03 LAB
25(OH)D3 SERPL-MCNC: 43.8 NG/ML (ref 30–100)
ARTICHOKE IGE QN: 62 MG/DL (ref 0–100)
FERRITIN SERPL-MCNC: 897 NG/ML (ref 13–150)
IRON 24H UR-MRATE: 89 MCG/DL (ref 37–145)
IRON SATN MFR SERPL: 28 % (ref 20–50)
T4 FREE SERPL-MCNC: 1.32 NG/DL (ref 0.93–1.7)
TIBC SERPL-MCNC: 313 MCG/DL (ref 298–536)
TRANSFERRIN SERPL-MCNC: 210 MG/DL (ref 200–360)
TSH SERPL DL<=0.05 MIU/L-ACNC: 1.77 UIU/ML (ref 0.27–4.2)
VIT B12 BLD-MCNC: 449 PG/ML (ref 211–946)

## 2022-12-05 ENCOUNTER — OFFICE VISIT (OUTPATIENT)
Dept: FAMILY MEDICINE CLINIC | Facility: CLINIC | Age: 87
End: 2022-12-05

## 2022-12-05 VITALS — HEIGHT: 59 IN | BODY MASS INDEX: 22.38 KG/M2 | WEIGHT: 111 LBS

## 2022-12-05 DIAGNOSIS — F41.1 GENERALIZED ANXIETY DISORDER: Chronic | ICD-10-CM

## 2022-12-05 DIAGNOSIS — F33.41 RECURRENT MAJOR DEPRESSIVE DISORDER, IN PARTIAL REMISSION: Chronic | ICD-10-CM

## 2022-12-05 DIAGNOSIS — R51.9 CHRONIC DAILY HEADACHE: Chronic | ICD-10-CM

## 2022-12-05 DIAGNOSIS — D50.8 IRON DEFICIENCY ANEMIA SECONDARY TO INADEQUATE DIETARY IRON INTAKE: Chronic | ICD-10-CM

## 2022-12-05 DIAGNOSIS — E87.1 HYPONATREMIA: Chronic | ICD-10-CM

## 2022-12-05 DIAGNOSIS — I10 ESSENTIAL HYPERTENSION: Chronic | ICD-10-CM

## 2022-12-05 DIAGNOSIS — R91.1 NODULE OF LEFT LUNG: Chronic | ICD-10-CM

## 2022-12-05 DIAGNOSIS — F03.A0 MILD DEMENTIA WITHOUT BEHAVIORAL DISTURBANCE, PSYCHOTIC DISTURBANCE, MOOD DISTURBANCE, OR ANXIETY, UNSPECIFIED DEMENTIA TYPE: Chronic | ICD-10-CM

## 2022-12-05 DIAGNOSIS — E83.42 HYPOMAGNESEMIA: Chronic | ICD-10-CM

## 2022-12-05 DIAGNOSIS — D51.8 VITAMIN B12 DEFICIENCY (DIETARY) ANEMIA: Chronic | ICD-10-CM

## 2022-12-05 DIAGNOSIS — I71.20 THORACIC AORTIC ANEURYSM WITHOUT RUPTURE, UNSPECIFIED PART: Chronic | ICD-10-CM

## 2022-12-05 DIAGNOSIS — E78.2 MIXED HYPERLIPIDEMIA: Primary | Chronic | ICD-10-CM

## 2022-12-05 LAB — HBA1C MFR BLD: 4.6 % (ref 3.5–5.6)

## 2022-12-05 PROCEDURE — G2025 DIS SITE TELE SVCS RHC/FQHC: HCPCS | Performed by: INTERNAL MEDICINE

## 2022-12-05 NOTE — PROGRESS NOTES
You have chosen to receive care through a telephone visit. Do you consent to use a telephone visit for your medical care today? Yes  I was located in my office and Idania was located at her home at time of the televisit today.    Chief Complaint  Follow-up (6 month. Her daughter is with her today. ), Headache (X 2 days. States they are chronic ), Anxiety, Depression, and Pain    Subjective        History of Present Illness     Brittney Rollins receives care today via telephone visit with her daughter, Iliana, helping to relay information for patient today for 6-month follow up on hyperlipidemia, hypertension, GERD, osteoporosis, REY, and hypokalemia among other issues.  She has a mild thoracic aortic aneurysm and also has an incidental finding of 8 mm left pulmonary nodule, which appeared benign on CT 03/2019.  We will plan to repeat CT imaging with her next 6 month follow up visit.      She continues to take Xanax chronically for anxiety/sleep. She takes one Tramadol/Tylenol daily twice daily for chronic lower lumbar back pain.  Her daughter feels pain is not managed with this plan.  We discussed switching the Tylenol to Tylenol Arthritis twice daily to boost pain relief. She also reports chronic daily headaches, but states the Tramadol/Tylenol helps manage the headache pain, which has been worse the past couple of days.  She keeps her thermostat set on 75.  Denies significant side effects with the Tramadol and Xanax including constipation or excessive daytime sedation.    With her visit approximately 6 weeks ago, I sent a prescription for Namenda, which she is now taking twice daily.  We are avoiding Aricept due to potential to exacerbate GI symptoms in this patient with history of IBS and gastritis.  We also sent a prescription for Lexapro 5 mg daily.  Her daughter feels she is tolerating the medications well other than some appetite suppression.  Patient reports her weight has remained stable and has not  "noticed decreased appetite.           We started patient on enteric coated 81 mg aspirin q.o.d. last spring to help decrease cardiovascular risk after an episode of lethargy and confusion.  Her cholesterol is at goal with Lipitor.     Labs in October revealed magnesium remained low, for which we started magnesium oxide 250 mg daily.  Repeat labs reveal improved magnesium at 1.7.        The patient's relevant past medical, surgical, and social history was reviewed in Epic.   Lab results are reviewed with the patient today.  CBC reveals hemoglobin improved at 12.5.  Normal thyroid screen.  Iron and B-12 acceptable with oral supplements.  Cholesterol at goal with Lipitor.       Objective   Vital Signs:  Ht 149.9 cm (59\")   Wt 50.3 kg (111 lb)   BMI 22.42 kg/m²   Estimated body mass index is 22.42 kg/m² as calculated from the following:    Height as of this encounter: 149.9 cm (59\").    Weight as of this encounter: 50.3 kg (111 lb).    BMI is within normal parameters. No other follow-up for BMI required.      Physical Exam   Result Review :    CMP    CMP 10/20/22 10/26/22 12/2/22   Glucose  90 100 (A)   Glucose 93     BUN 11 11 9   Creatinine 0.9 0.90 0.90   Sodium 139 133 (A) 130 (A)   Potassium 3.7 4.1 4.3   Chloride 106 103 97 (A)   Calcium 8.6 9.2 9.3   Albumin  4.50 4.40   Total Bilirubin  0.7 0.9   Alkaline Phosphatase  97 85   AST (SGOT)  40 (A) 38 (A)   ALT (SGPT)  27 33   (A) Abnormal value            CBC w/diff    CBC w/Diff 4/15/22 10/26/22 12/2/22   WBC 4.78 3.31 (A) 4.56   RBC 3.92 3.50 (A) 3.86   Hemoglobin 12.6 11.0 (A) 12.5   Hematocrit 36.3 32.9 (A) 35.0   MCV 92.6 94.0 90.7   MCH 32.1 31.4 32.4   MCHC 34.7 33.4 35.7   RDW 12.3 13.9 12.4   Platelets 172 183 193   Neutrophil Rel % 61.6  76.7 (A)   Lymphocyte Rel % 25.7  12.1 (A)   Monocyte Rel % 10.0  8.6   Eosinophil Rel % 2.5  2.2   Basophil Rel % 0.2  0.4   (A) Abnormal value            Lipid Panel    Lipid Panel 4/15/22 12/2/22   Total Cholesterol " 152    Triglycerides 155 (A)    HDL Cholesterol 57    VLDL Cholesterol 26    LDL Cholesterol  69 62   LDL/HDL Ratio 1.12    (A) Abnormal value            TSH    TSH 4/15/22 12/2/22   TSH 5.360 (A) 1.770   (A) Abnormal value            A1C Last 3 Results    HGBA1C Last 3 Results 4/15/22 12/2/22   Hemoglobin A1C 4.90 4.6                     Assessment and Plan   Diagnoses and all orders for this visit:    1. Mixed hyperlipidemia (Primary)  -     Comprehensive Metabolic Panel; Future  -     Lipid Panel; Future    2. Essential hypertension  -     Comprehensive Metabolic Panel; Future    3. Hyponatremia  -     Comprehensive Metabolic Panel; Future    4. Vitamin B12 deficiency (dietary) anemia  -     CBC Auto Differential; Future  -     Vitamin B12; Future    5. Iron deficiency anemia secondary to inadequate dietary iron intake  -     CBC Auto Differential; Future  -     Ferritin; Future  -     Iron Profile; Future    6. Recurrent major depressive disorder, in partial remission (HCC)    7. Generalized anxiety disorder    8. Mild dementia without behavioral disturbance, psychotic disturbance, mood disturbance, or anxiety, unspecified dementia type    9. Hypomagnesemia  -     Magnesium; Future    10. Thoracic aortic aneurysm without rupture, unspecified part    11. Nodule of left lung    12. Chronic daily headache           I spent 30 minutes caring for Brittney on this date of service. This time includes time spent by me in the following activities:preparing for the visit, reviewing tests, obtaining and/or reviewing a separately obtained history, counseling and educating the patient/family/caregiver, ordering medications, tests, or procedures, documenting information in the medical record and care coordination with her daughter.        Continue the Tramadol/Tylenol, which gives reasonable control of chronic low back and neck pain, although, headaches have been worse the past few days.  She is currently just taking Tylenol  500 mg twice daily I recommended she switch Tylenol to Tylenol Arthritis two tablets b.i.d. Continue tramadol 50 mg twice daily as needed.  I recommended she lower her thermostat from the current 75 degrees, which is excessively drying out the air in her home, and run a humidifier continuously in the bedroom through the winter months and can be making the headaches worse.        Continue the oral magnesium supplement.  Hypomagnesemia improved.      Weigh at least once weekly to make sure patient is not having unintentional weight loss.  Her daughter felt like patient was having decreased appetite, although, patient reports appetite has been stable.  Otherwise, she is tolerating the Namenda and Lexapro well without issues.       Continue Namenda for Alzheimer's dementia and Lexapro as well as  Xanax for REY/depression.    We are not using Aricept due to her GI history and potential for Aricept to suppress appetite.  We will refill these other meds when due.     Hemoglobin improved as well as B-12 and iron levels.  Continue the oral B-12 and iron.      Continue oral potassium and lisinopril.  Hypokalemia and hypertension at goal.       Continue the vascular risk factor modifications, including Lipitor, lisinopril and atenolol.  LDL cholesterol at goal.      Return in six months for routine follow up with fasting labs one week prior or sooner if needed.  We will plan to arrange CT of the chest to evaluate mild thoracic aortic aneurysm and also has an incidental finding of 8 mm left pulmonary nodule.      Scribed for Dr. Dillon by Sophie Méndez [facility].     Follow Up   Return in about 6 months (around 6/5/2023) for Follow up in six months with labs one week prior..  Patient was given instructions and counseling regarding her condition or for health maintenance advice. Please see specific information pulled into the AVS if appropriate.

## 2022-12-06 PROBLEM — R51.9 CHRONIC DAILY HEADACHE: Chronic | Status: ACTIVE | Noted: 2022-12-06

## 2023-01-03 ENCOUNTER — TELEPHONE (OUTPATIENT)
Dept: FAMILY MEDICINE CLINIC | Facility: CLINIC | Age: 88
End: 2023-01-03
Payer: MEDICARE

## 2023-01-03 DIAGNOSIS — R30.0 DYSURIA: Primary | ICD-10-CM

## 2023-01-03 NOTE — TELEPHONE ENCOUNTER
Her daughter called and states she thinks her mother has UTI again and doesn't want to bring her in due to being so difficult to transport. Her last UTI was 10/18/22 with no growth on culture and was treated with Keflex 500 mg bid. She has several med allergies. Donald

## 2023-01-04 ENCOUNTER — OFFICE VISIT (OUTPATIENT)
Dept: FAMILY MEDICINE CLINIC | Facility: CLINIC | Age: 88
End: 2023-01-04
Payer: MEDICARE

## 2023-01-04 ENCOUNTER — LAB (OUTPATIENT)
Dept: LAB | Facility: OTHER | Age: 88
End: 2023-01-04
Payer: MEDICARE

## 2023-01-04 VITALS — HEIGHT: 59 IN | WEIGHT: 111 LBS | BODY MASS INDEX: 22.38 KG/M2

## 2023-01-04 DIAGNOSIS — M54.50 CHRONIC BILATERAL LOW BACK PAIN WITHOUT SCIATICA: Chronic | ICD-10-CM

## 2023-01-04 DIAGNOSIS — G47.9 SLEEP DISORDER: Chronic | ICD-10-CM

## 2023-01-04 DIAGNOSIS — R30.0 DYSURIA: ICD-10-CM

## 2023-01-04 DIAGNOSIS — N30.00 ACUTE CYSTITIS WITHOUT HEMATURIA: Primary | ICD-10-CM

## 2023-01-04 DIAGNOSIS — G89.29 CHRONIC BILATERAL LOW BACK PAIN WITHOUT SCIATICA: Chronic | ICD-10-CM

## 2023-01-04 DIAGNOSIS — F41.1 GENERALIZED ANXIETY DISORDER: Chronic | ICD-10-CM

## 2023-01-04 LAB
BACTERIA UR QL AUTO: ABNORMAL /HPF
BILIRUB UR QL STRIP: NEGATIVE
CLARITY UR: ABNORMAL
COLOR UR: ABNORMAL
GLUCOSE UR STRIP-MCNC: NEGATIVE MG/DL
HGB UR QL STRIP.AUTO: NEGATIVE
HYALINE CASTS UR QL AUTO: ABNORMAL /LPF
KETONES UR QL STRIP: NEGATIVE
LEUKOCYTE ESTERASE UR QL STRIP.AUTO: ABNORMAL
NITRITE UR QL STRIP: NEGATIVE
PH UR STRIP.AUTO: 8.5 [PH] (ref 5.5–8)
PROT UR QL STRIP: ABNORMAL
RBC # UR STRIP: ABNORMAL /HPF
REF LAB TEST METHOD: ABNORMAL
SP GR UR STRIP: 1.01 (ref 1–1.03)
SQUAMOUS #/AREA URNS HPF: ABNORMAL /HPF
UROBILINOGEN UR QL STRIP: ABNORMAL
WBC # UR STRIP: ABNORMAL /HPF

## 2023-01-04 PROCEDURE — 87088 URINE BACTERIA CULTURE: CPT | Performed by: INTERNAL MEDICINE

## 2023-01-04 PROCEDURE — G2025 DIS SITE TELE SVCS RHC/FQHC: HCPCS | Performed by: INTERNAL MEDICINE

## 2023-01-04 PROCEDURE — 87186 SC STD MICRODIL/AGAR DIL: CPT | Performed by: INTERNAL MEDICINE

## 2023-01-04 PROCEDURE — 81001 URINALYSIS AUTO W/SCOPE: CPT | Performed by: INTERNAL MEDICINE

## 2023-01-04 PROCEDURE — 87086 URINE CULTURE/COLONY COUNT: CPT | Performed by: INTERNAL MEDICINE

## 2023-01-04 RX ORDER — SENNOSIDES 8.6 MG
1300 CAPSULE ORAL 2 TIMES DAILY
COMMUNITY

## 2023-01-04 RX ORDER — CEFUROXIME AXETIL 250 MG/1
250 TABLET ORAL 2 TIMES DAILY
Qty: 14 TABLET | Refills: 0 | Status: SHIPPED | OUTPATIENT
Start: 2023-01-04 | End: 2023-01-11

## 2023-01-04 NOTE — PROGRESS NOTES
You have chosen to receive care through a telephone visit. Do you consent to use a telephone visit for your medical care today? Yes  I was located in my office and Idania was located in her home at time of the televisit.    Chief Complaint  Difficulty Urinating (Burning ), Anxiety, and Insomnia    Subjective        History of Present Illness     Brittney Rollins receives care telephone visit reporting waxing and waning symptoms of dysuria/burning with urination for the past week.  Denies bladder spasms.  UA obtained today reveals evidence of acute UTI.  Her last UTI was 10/18/22 with no growth on culture and was treated with Keflex.      She continues to take Xanax chronically for anxiety/sleep. She also takes one Tramadol/Tylenol twice daily for chronic lower lumbar back pain. Denies significant side effects with the Tramadol and Xanax including constipation or excessive daytime sedation.      Objective   Vital Signs:  Ht 149.9 cm (59\")   Wt 50.3 kg (111 lb)   BMI 22.42 kg/m²   Estimated body mass index is 22.42 kg/m² as calculated from the following:    Height as of this encounter: 149.9 cm (59\").    Weight as of this encounter: 50.3 kg (111 lb).    BMI is within normal parameters. No other follow-up for BMI required.      Physical Exam   Result Review :    UA    Urinalysis 1/4/23 1/4/23    1313 1313   Squamous Epithelial Cells, UA  0-2   Specific [place], UA 1.015    Ketones, UA Negative    Blood, UA Negative    Leukocytes, UA Large (3+) (A)    Nitrite, UA Negative    RBC, UA  0-2 (A)   WBC, UA  13-20 (A)   Bacteria, UA  4+ (A)   (A) Abnormal value            Urine Culture    Urine Culture 1/4/23   Urine Culture >100,000 CFU/mL Escherichia coli (A) (P)   (A) Abnormal value            Data reviewed: [facility] report       Future Appointments   Date Time Provider Department Center   6/9/2023  1:00 PM Pollo Dillon MD [facility]        Assessment and Plan   Diagnoses and all orders for this visit:    1. Acute  cystitis without hematuria (Primary)    2. Generalized anxiety disorder    3. Sleep disorder    4. Chronic bilateral low back pain without sciatica    Other orders  -     cefuroxime (CEFTIN) 250 MG tablet; Take 1 tablet by mouth 2 (Two) Times a Day for 7 days. For UTI  Dispense: 14 tablet; Refill: 0                For the acute UTI, prescription sent for Ceftin 250 mg b.i.d. x 7 days.  We will review urine culture results when available and make any change in treatment plan as indicated by results.      Continue the Tramadol/Tylenol.  She may find switching plain Tylenol to Tylenol Arthritis 2 tablets twice daily gives additional pain relief.     Continue the Xanax for sleep disorder/anxiety.  Stable.  We will provide refills when due.       Return 06/2023 for routine follow up with fasting labs one week prior or sooner if needed.     Scribed for Dr. Dillon by Sophie Méndez [facility].     Follow Up   Return if symptoms worsen or fail to improve, for Next scheduled follow up.  Patient was given instructions and counseling regarding her condition or for health maintenance advice. Please see specific information pulled into the AVS if appropriate.

## 2023-01-04 NOTE — TELEPHONE ENCOUNTER
Dr. Dillon stats that the patient needs a UA before decision can be made on antibiotic. I explained to her daughter and she will  the specimen cup. TP

## 2023-01-05 RX ORDER — ATORVASTATIN CALCIUM 10 MG/1
10 TABLET, FILM COATED ORAL DAILY
Qty: 90 TABLET | Refills: 1 | Status: SHIPPED | OUTPATIENT
Start: 2023-01-05

## 2023-01-06 LAB — BACTERIA SPEC AEROBE CULT: ABNORMAL

## 2023-01-13 RX ORDER — OMEPRAZOLE 40 MG/1
40 CAPSULE, DELAYED RELEASE ORAL EVERY MORNING
Qty: 90 CAPSULE | Refills: 1 | Status: SHIPPED | OUTPATIENT
Start: 2023-01-13

## 2023-01-13 RX ORDER — POTASSIUM CHLORIDE 750 MG/1
TABLET, EXTENDED RELEASE ORAL
Qty: 90 TABLET | Refills: 1 | Status: SHIPPED | OUTPATIENT
Start: 2023-01-13

## 2023-01-20 RX ORDER — ATENOLOL 50 MG/1
50 TABLET ORAL DAILY
Qty: 90 TABLET | Refills: 3 | Status: SHIPPED | OUTPATIENT
Start: 2023-01-20

## 2023-02-13 RX ORDER — LISINOPRIL 10 MG/1
10 TABLET ORAL DAILY
Qty: 90 TABLET | Refills: 3 | Status: SHIPPED | OUTPATIENT
Start: 2023-02-13

## 2023-02-13 RX ORDER — FERROUS SULFATE 325(65) MG
TABLET ORAL
Qty: 45 TABLET | Refills: 3 | Status: SHIPPED | OUTPATIENT
Start: 2023-02-13